# Patient Record
Sex: FEMALE | Race: BLACK OR AFRICAN AMERICAN | NOT HISPANIC OR LATINO | Employment: OTHER | URBAN - METROPOLITAN AREA
[De-identification: names, ages, dates, MRNs, and addresses within clinical notes are randomized per-mention and may not be internally consistent; named-entity substitution may affect disease eponyms.]

---

## 2023-04-18 LAB
ANION GAP IN SER/PLAS: 10 MMOL/L (ref 10–20)
BASOPHILS (10*3/UL) IN BLOOD BY AUTOMATED COUNT: 0.03 X10E9/L (ref 0–0.1)
BASOPHILS/100 LEUKOCYTES IN BLOOD BY AUTOMATED COUNT: 0.7 % (ref 0–2)
CALCIUM (MG/DL) IN SER/PLAS: 8.6 MG/DL (ref 8.6–10.3)
CARBON DIOXIDE, TOTAL (MMOL/L) IN SER/PLAS: 24 MMOL/L (ref 21–32)
CHLORIDE (MMOL/L) IN SER/PLAS: 109 MMOL/L (ref 98–107)
CREATININE (MG/DL) IN SER/PLAS: 1.07 MG/DL (ref 0.5–1.05)
EOSINOPHILS (10*3/UL) IN BLOOD BY AUTOMATED COUNT: 0.11 X10E9/L (ref 0–0.7)
EOSINOPHILS/100 LEUKOCYTES IN BLOOD BY AUTOMATED COUNT: 2.4 % (ref 0–6)
ERYTHROCYTE DISTRIBUTION WIDTH (RATIO) BY AUTOMATED COUNT: 14.9 % (ref 11.5–14.5)
ERYTHROCYTE MEAN CORPUSCULAR HEMOGLOBIN CONCENTRATION (G/DL) BY AUTOMATED: 31.6 G/DL (ref 32–36)
ERYTHROCYTE MEAN CORPUSCULAR VOLUME (FL) BY AUTOMATED COUNT: 86 FL (ref 80–100)
ERYTHROCYTES (10*6/UL) IN BLOOD BY AUTOMATED COUNT: 4.85 X10E12/L (ref 4–5.2)
GFR FEMALE: 58 ML/MIN/1.73M2
GLUCOSE (MG/DL) IN SER/PLAS: 95 MG/DL (ref 74–99)
HEMATOCRIT (%) IN BLOOD BY AUTOMATED COUNT: 41.8 % (ref 36–46)
HEMOGLOBIN (G/DL) IN BLOOD: 13.2 G/DL (ref 12–16)
IMMATURE GRANULOCYTES/100 LEUKOCYTES IN BLOOD BY AUTOMATED COUNT: 0 % (ref 0–0.9)
LEUKOCYTES (10*3/UL) IN BLOOD BY AUTOMATED COUNT: 4.6 X10E9/L (ref 4.4–11.3)
LYMPHOCYTES (10*3/UL) IN BLOOD BY AUTOMATED COUNT: 1.2 X10E9/L (ref 1.2–4.8)
LYMPHOCYTES/100 LEUKOCYTES IN BLOOD BY AUTOMATED COUNT: 26.1 % (ref 13–44)
MONOCYTES (10*3/UL) IN BLOOD BY AUTOMATED COUNT: 0.33 X10E9/L (ref 0.1–1)
MONOCYTES/100 LEUKOCYTES IN BLOOD BY AUTOMATED COUNT: 7.2 % (ref 2–10)
NEUTROPHILS (10*3/UL) IN BLOOD BY AUTOMATED COUNT: 2.93 X10E9/L (ref 1.2–7.7)
NEUTROPHILS/100 LEUKOCYTES IN BLOOD BY AUTOMATED COUNT: 63.6 % (ref 40–80)
PLATELETS (10*3/UL) IN BLOOD AUTOMATED COUNT: 204 X10E9/L (ref 150–450)
POTASSIUM (MMOL/L) IN SER/PLAS: 3.7 MMOL/L (ref 3.5–5.3)
SODIUM (MMOL/L) IN SER/PLAS: 139 MMOL/L (ref 136–145)
UREA NITROGEN (MG/DL) IN SER/PLAS: 15 MG/DL (ref 6–23)

## 2023-04-19 LAB — STAPH/MRSA SCREEN, CULTURE: NORMAL

## 2023-04-26 LAB — STAPH/MRSA SCREEN, CULTURE: NORMAL

## 2023-04-29 LAB — STAPH/MRSA SCREEN, CULTURE: NORMAL

## 2023-07-06 PROBLEM — M25.569 PAIN IN JOINT, LOWER LEG: Status: ACTIVE | Noted: 2023-07-06

## 2023-07-06 PROBLEM — E04.1 THYROID NODULE: Status: ACTIVE | Noted: 2023-07-06

## 2023-07-06 PROBLEM — M79.10 MYALGIA: Status: ACTIVE | Noted: 2023-07-06

## 2023-07-06 PROBLEM — L30.9 DERMATITIS: Status: ACTIVE | Noted: 2023-07-06

## 2023-07-06 PROBLEM — M48.062 NEUROGENIC CLAUDICATION DUE TO LUMBAR SPINAL STENOSIS: Status: ACTIVE | Noted: 2023-07-06

## 2023-07-06 PROBLEM — F43.22 ADJUSTMENT DISORDER WITH ANXIOUS MOOD: Status: ACTIVE | Noted: 2023-07-06

## 2023-07-06 PROBLEM — N95.1 POSTMENOPAUSAL DISORDER: Status: ACTIVE | Noted: 2023-07-06

## 2023-07-06 PROBLEM — M79.609 LIMB PAIN: Status: ACTIVE | Noted: 2023-07-06

## 2023-07-06 PROBLEM — F41.9 ANXIETY: Status: ACTIVE | Noted: 2023-07-06

## 2023-07-06 PROBLEM — B34.9 VIRAL SYNDROME: Status: RESOLVED | Noted: 2023-07-06 | Resolved: 2023-07-06

## 2023-07-06 PROBLEM — M25.559 HIP PAIN: Status: ACTIVE | Noted: 2023-07-06

## 2023-07-06 PROBLEM — M19.90 ARTHRITIS: Status: ACTIVE | Noted: 2023-07-06

## 2023-07-06 PROBLEM — M54.9 BACK PAIN: Status: ACTIVE | Noted: 2023-07-06

## 2023-07-06 PROBLEM — R32 URINARY INCONTINENCE: Status: ACTIVE | Noted: 2023-07-06

## 2023-07-06 PROBLEM — R92.8 ABNORMAL FINDING ON BREAST IMAGING: Status: ACTIVE | Noted: 2023-07-06

## 2023-07-06 PROBLEM — M17.11 ARTHRITIS OF RIGHT KNEE: Status: ACTIVE | Noted: 2023-07-06

## 2023-07-06 PROBLEM — R35.0 URINARY FREQUENCY: Status: RESOLVED | Noted: 2023-07-06 | Resolved: 2023-07-06

## 2023-07-06 PROBLEM — N89.8 VAGINAL DISCHARGE: Status: RESOLVED | Noted: 2023-07-06 | Resolved: 2023-07-06

## 2023-07-06 PROBLEM — M35.9 UNDIFFERENTIATED CONNECTIVE TISSUE DISEASE (MULTI): Status: ACTIVE | Noted: 2023-07-06

## 2023-07-06 PROBLEM — J98.01 ACUTE BRONCHOSPASM: Status: RESOLVED | Noted: 2023-07-06 | Resolved: 2023-07-06

## 2023-07-06 PROBLEM — R79.9 ABNORMAL BLOOD CHEMISTRY: Status: ACTIVE | Noted: 2023-07-06

## 2023-07-06 PROBLEM — S93.409A ANKLE SPRAIN: Status: ACTIVE | Noted: 2023-07-06

## 2023-07-06 PROBLEM — R63.4 WEIGHT LOSS: Status: ACTIVE | Noted: 2023-07-06

## 2023-07-06 PROBLEM — R05.9 COUGH: Status: ACTIVE | Noted: 2023-07-06

## 2023-07-06 PROBLEM — J01.90 ACUTE SINUSITIS: Status: RESOLVED | Noted: 2023-07-06 | Resolved: 2023-07-06

## 2023-07-06 PROBLEM — B37.9 YEAST INFECTION: Status: RESOLVED | Noted: 2023-07-06 | Resolved: 2023-07-06

## 2023-07-06 PROBLEM — I10 ESSENTIAL HYPERTENSION: Status: ACTIVE | Noted: 2023-07-06

## 2023-07-06 PROBLEM — G89.29 CHRONIC PAIN: Status: ACTIVE | Noted: 2023-07-06

## 2023-07-06 PROBLEM — J06.9 ACUTE UPPER RESPIRATORY INFECTION: Status: RESOLVED | Noted: 2023-07-06 | Resolved: 2023-07-06

## 2023-07-06 PROBLEM — F32.A DEPRESSION: Status: ACTIVE | Noted: 2023-07-06

## 2023-07-06 PROBLEM — K58.9 IRRITABLE BOWEL SYNDROME: Status: ACTIVE | Noted: 2023-07-06

## 2023-07-06 PROBLEM — E78.5 HYPERLIPIDEMIA: Status: ACTIVE | Noted: 2023-07-06

## 2023-07-06 PROBLEM — R76.0 ABNORMAL ANTINUCLEAR ANTIBODY TITER: Status: ACTIVE | Noted: 2023-07-06

## 2023-07-06 PROBLEM — N39.3 SUI (STRESS URINARY INCONTINENCE, FEMALE): Status: ACTIVE | Noted: 2023-07-06

## 2023-07-06 PROBLEM — J20.9 ACUTE BRONCHITIS: Status: RESOLVED | Noted: 2023-07-06 | Resolved: 2023-07-06

## 2023-07-06 PROBLEM — M62.830 BACK MUSCLE SPASM: Status: ACTIVE | Noted: 2023-07-06

## 2023-07-06 PROBLEM — S60.221A CONTUSION OF RIGHT HAND: Status: ACTIVE | Noted: 2023-07-06

## 2023-07-06 PROBLEM — E55.9 VITAMIN D DEFICIENCY: Status: ACTIVE | Noted: 2023-07-06

## 2023-07-06 PROBLEM — E89.0 STATUS POST PARTIAL THYROIDECTOMY: Status: ACTIVE | Noted: 2023-07-06
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
## 2023-07-06 PROBLEM — R87.810 CERVICAL HIGH RISK HPV (HUMAN PAPILLOMAVIRUS) TEST POSITIVE: Status: ACTIVE | Noted: 2023-07-06

## 2023-07-06 PROBLEM — M79.7 FIBROMYALGIA: Status: ACTIVE | Noted: 2023-07-06

## 2023-07-06 PROBLEM — R23.3 EASY BRUISING: Status: ACTIVE | Noted: 2023-07-06

## 2023-07-06 PROBLEM — M17.31 POST-TRAUMATIC OSTEOARTHRITIS OF RIGHT KNEE: Status: ACTIVE | Noted: 2023-07-06

## 2023-07-06 PROBLEM — M54.12 CERVICAL RADICULITIS: Status: ACTIVE | Noted: 2023-07-06

## 2023-07-06 RX ORDER — DICYCLOMINE HYDROCHLORIDE 20 MG/1
1 TABLET ORAL 4 TIMES DAILY PRN
COMMUNITY
Start: 2013-10-21

## 2023-07-06 RX ORDER — ERGOCALCIFEROL 1.25 MG/1
1 CAPSULE ORAL WEEKLY
COMMUNITY
Start: 2021-08-03 | End: 2024-03-27 | Stop reason: ALTCHOICE

## 2023-07-06 RX ORDER — TIZANIDINE 4 MG/1
1 TABLET ORAL 2 TIMES DAILY
COMMUNITY
Start: 2022-03-07

## 2023-07-06 RX ORDER — LISINOPRIL AND HYDROCHLOROTHIAZIDE 20; 25 MG/1; MG/1
1 TABLET ORAL DAILY
COMMUNITY
Start: 2019-11-07 | End: 2023-07-07 | Stop reason: SDUPTHER

## 2023-07-06 RX ORDER — TOPIRAMATE 100 MG/1
1 TABLET, FILM COATED ORAL 2 TIMES DAILY
COMMUNITY
Start: 2021-03-10 | End: 2024-06-03

## 2023-07-06 RX ORDER — HYDROXYCHLOROQUINE SULFATE 200 MG/1
1 TABLET, FILM COATED ORAL 2 TIMES DAILY
COMMUNITY
Start: 2017-02-15 | End: 2024-02-19 | Stop reason: SDUPTHER

## 2023-07-07 ENCOUNTER — OFFICE VISIT (OUTPATIENT)
Dept: PRIMARY CARE | Facility: CLINIC | Age: 63
End: 2023-07-07
Payer: COMMERCIAL

## 2023-07-07 VITALS
SYSTOLIC BLOOD PRESSURE: 145 MMHG | HEART RATE: 84 BPM | DIASTOLIC BLOOD PRESSURE: 99 MMHG | BODY MASS INDEX: 33.08 KG/M2 | OXYGEN SATURATION: 98 % | WEIGHT: 224 LBS

## 2023-07-07 DIAGNOSIS — I10 ESSENTIAL HYPERTENSION: Primary | ICD-10-CM

## 2023-07-07 DIAGNOSIS — M79.7 FIBROMYALGIA: ICD-10-CM

## 2023-07-07 DIAGNOSIS — N18.31 STAGE 3A CHRONIC KIDNEY DISEASE (MULTI): ICD-10-CM

## 2023-07-07 DIAGNOSIS — M35.9 UNDIFFERENTIATED CONNECTIVE TISSUE DISEASE (MULTI): ICD-10-CM

## 2023-07-07 PROBLEM — S83.249A ACUTE MEDIAL MENISCUS TEAR: Status: RESOLVED | Noted: 2018-05-01 | Resolved: 2023-07-07

## 2023-07-07 PROBLEM — M25.551 PAIN OF BOTH HIP JOINTS: Status: ACTIVE | Noted: 2017-08-28

## 2023-07-07 PROBLEM — M70.62 TROCHANTERIC BURSITIS, LEFT HIP: Status: ACTIVE | Noted: 2018-01-22

## 2023-07-07 PROBLEM — M47.816 LUMBAR SPONDYLOSIS: Status: ACTIVE | Noted: 2017-07-21

## 2023-07-07 PROBLEM — M25.552 PAIN OF BOTH HIP JOINTS: Status: ACTIVE | Noted: 2017-08-28

## 2023-07-07 PROCEDURE — 99213 OFFICE O/P EST LOW 20 MIN: CPT | Performed by: PHYSICIAN ASSISTANT

## 2023-07-07 PROCEDURE — 1036F TOBACCO NON-USER: CPT | Performed by: PHYSICIAN ASSISTANT

## 2023-07-07 PROCEDURE — 3080F DIAST BP >= 90 MM HG: CPT | Performed by: PHYSICIAN ASSISTANT

## 2023-07-07 PROCEDURE — 3008F BODY MASS INDEX DOCD: CPT | Performed by: PHYSICIAN ASSISTANT

## 2023-07-07 PROCEDURE — 3077F SYST BP >= 140 MM HG: CPT | Performed by: PHYSICIAN ASSISTANT

## 2023-07-07 RX ORDER — LISINOPRIL AND HYDROCHLOROTHIAZIDE 20; 25 MG/1; MG/1
1 TABLET ORAL DAILY
Qty: 90 TABLET | Refills: 1 | Status: SHIPPED | OUTPATIENT
Start: 2023-07-07 | End: 2024-01-10 | Stop reason: SDUPTHER

## 2023-07-07 NOTE — PROGRESS NOTES
Subjective   Libra Chery is a 62 y.o. female who presents for 6 month f/u.  HPI Libra Chery is a 62 y.o. female presenting for a follow up. Generally doing well.  No complaints.    HTN: uncontrolled on current regimen, patient attributes this to forgetting to take her BP medication yesterday. Does not check BP at home. Denies HA, dizziness, chest pain, SOB/PAUL, LE edema.     Fibromyalgia, UCTD, chronic pain: follows with rheumatology, pain management, and orthopedics. Stable on current pain regimen-hydroxychloroquine 200 mg, tizanidine 4 mg, topiramate 100 mg.  S/p R TKA 5/1/23. She is attending PT 1x/week.  She is very pleased with her progress following the TKA.  Ambulating without assistive devices.  Encouraged to schedule for an eye exam with hydroxychloroquine use.    CKD, stage IIIa: Stable.  Avoiding nephrotoxic medications. On lisinopril 20 mg.  Not on any SGLT2 inhibitors    Health maintenance:  Immunizations:  -Flu: Deferred to fall 2023  -Shingrix: recommended, deferred to next appointment due to not having it in stock  -Tdap: Recommended  Mammogram UTD (12/8/22)   Colon CA screening UTD (last 6/16/15) - 10yrs  DEXA last 6/19/15 - normal bone density  CT cardiac scoring due - deferred     Last CPE: 1/6/23 (commercial)    12 point ROS reviewed and negative other than as stated in HPI    BP (!) 145/99   Pulse 84   Wt 102 kg (224 lb)   SpO2 98%   BMI 33.08 kg/m²   Objective   Physical Exam  Vitals reviewed.   Constitutional:       General: She is not in acute distress.     Appearance: Normal appearance. She is not ill-appearing.   HENT:      Head: Normocephalic and atraumatic.   Eyes:      General: No scleral icterus.     Extraocular Movements: Extraocular movements intact.      Conjunctiva/sclera: Conjunctivae normal.      Pupils: Pupils are equal, round, and reactive to light.   Cardiovascular:      Rate and Rhythm: Normal rate and regular rhythm.      Heart sounds: Normal heart sounds. No  murmur heard.     No friction rub. No gallop.   Pulmonary:      Effort: Pulmonary effort is normal. No respiratory distress.      Breath sounds: Normal breath sounds. No stridor. No wheezing, rhonchi or rales.   Musculoskeletal:      Cervical back: Normal range of motion.      Right lower leg: No edema.      Left lower leg: No edema.   Skin:     General: Skin is warm and dry.      Comments: Well-healed scar on the right knee following TKA   Neurological:      Mental Status: She is alert and oriented to person, place, and time. Mental status is at baseline.      Cranial Nerves: No cranial nerve deficit.      Gait: Gait normal.   Psychiatric:         Mood and Affect: Mood normal.         Behavior: Behavior normal.         Assessment/Plan   Problem List Items Addressed This Visit       Essential hypertension - Primary     Above goal of <130/80.  Encourage patient to begin monitoring BP at home 1-2 times daily and logging values.  If consistently above goal, I recommend we discontinue lisinopril 20 mg and begin olmesartan 40 mg or valsartan 320 mg in addition to the hydrochlorothiazide 25 mg.  Follow strict DASH diet and exercise as tolerated.         Relevant Medications    lisinopriL-hydrochlorothiazide 20-25 mg tablet    Fibromyalgia     Follow with rheumatology.  Continue hydroxychloroquine 200 mg, tizanidine 4 mg, topiramate 100 mg.  Schedule for eye exam with hydroxychloroquine use         Undifferentiated connective tissue disease (CMS/HCC)     Stable.  Following with rheumatology.  Continue  hydroxychloroquine 200 mg, tizanidine 4 mg, topiramate 100 mg.         Stage 3a chronic kidney disease     Stable.  Encourage strict control of blood pressure to prevent further worsening of kidney disease.  Consider switching lisinopril 20 mg for olmesartan 40 mg or valsartan 320 mg in the future.  Consider SGLT2 inhibitor.  Avoid nephrotoxic medications drink adequate water.             Follow-up in 6 months for CPE or  sooner as needed

## 2023-07-07 NOTE — ASSESSMENT & PLAN NOTE
Above goal of <130/80.  Encourage patient to begin monitoring BP at home 1-2 times daily and logging values.  If consistently above goal, I recommend we discontinue lisinopril 20 mg and begin olmesartan 40 mg or valsartan 320 mg in addition to the hydrochlorothiazide 25 mg.  Follow strict DASH diet and exercise as tolerated.

## 2023-07-07 NOTE — ASSESSMENT & PLAN NOTE
Stable.  Encourage strict control of blood pressure to prevent further worsening of kidney disease.  Consider switching lisinopril 20 mg for olmesartan 40 mg or valsartan 320 mg in the future.  Consider SGLT2 inhibitor.  Avoid nephrotoxic medications drink adequate water.

## 2023-07-07 NOTE — ASSESSMENT & PLAN NOTE
Follow with rheumatology.  Continue hydroxychloroquine 200 mg, tizanidine 4 mg, topiramate 100 mg.  Schedule for eye exam with hydroxychloroquine use

## 2023-07-07 NOTE — ASSESSMENT & PLAN NOTE
Stable.  Following with rheumatology.  Continue  hydroxychloroquine 200 mg, tizanidine 4 mg, topiramate 100 mg.

## 2023-09-18 ENCOUNTER — CLINICAL SUPPORT (OUTPATIENT)
Dept: PRIMARY CARE | Facility: CLINIC | Age: 63
End: 2023-09-18
Payer: COMMERCIAL

## 2023-09-18 DIAGNOSIS — Z23 ENCOUNTER FOR IMMUNIZATION: ICD-10-CM

## 2023-09-18 PROCEDURE — 90471 IMMUNIZATION ADMIN: CPT | Performed by: PHYSICIAN ASSISTANT

## 2023-09-18 PROCEDURE — 90750 HZV VACC RECOMBINANT IM: CPT | Performed by: PHYSICIAN ASSISTANT

## 2023-11-28 ENCOUNTER — CLINICAL SUPPORT (OUTPATIENT)
Dept: PRIMARY CARE | Facility: CLINIC | Age: 63
End: 2023-11-28
Payer: COMMERCIAL

## 2023-11-28 DIAGNOSIS — Z23 ENCOUNTER FOR ADMINISTRATION OF VACCINE: Primary | ICD-10-CM

## 2023-11-28 PROCEDURE — 90471 IMMUNIZATION ADMIN: CPT | Performed by: PHYSICIAN ASSISTANT

## 2023-11-28 PROCEDURE — 90750 HZV VACC RECOMBINANT IM: CPT | Performed by: PHYSICIAN ASSISTANT

## 2023-12-05 ENCOUNTER — APPOINTMENT (OUTPATIENT)
Dept: ENDOCRINOLOGY | Facility: CLINIC | Age: 63
End: 2023-12-05
Payer: COMMERCIAL

## 2023-12-07 ENCOUNTER — TELEPHONE (OUTPATIENT)
Dept: PRIMARY CARE | Facility: CLINIC | Age: 63
End: 2023-12-07
Payer: COMMERCIAL

## 2023-12-12 ENCOUNTER — APPOINTMENT (OUTPATIENT)
Dept: RADIOLOGY | Facility: CLINIC | Age: 63
End: 2023-12-12
Payer: COMMERCIAL

## 2023-12-27 ENCOUNTER — CLINICAL SUPPORT (OUTPATIENT)
Dept: PRIMARY CARE | Facility: CLINIC | Age: 63
End: 2023-12-27
Payer: COMMERCIAL

## 2023-12-27 DIAGNOSIS — Z23 ENCOUNTER FOR IMMUNIZATION: ICD-10-CM

## 2023-12-27 PROCEDURE — 90686 IIV4 VACC NO PRSV 0.5 ML IM: CPT | Performed by: PHYSICIAN ASSISTANT

## 2023-12-27 PROCEDURE — 90471 IMMUNIZATION ADMIN: CPT | Performed by: PHYSICIAN ASSISTANT

## 2024-01-03 ENCOUNTER — ANCILLARY PROCEDURE (OUTPATIENT)
Dept: RADIOLOGY | Facility: CLINIC | Age: 64
End: 2024-01-03
Payer: COMMERCIAL

## 2024-01-03 DIAGNOSIS — Z12.39 ENCOUNTER FOR OTHER SCREENING FOR MALIGNANT NEOPLASM OF BREAST: ICD-10-CM

## 2024-01-03 PROCEDURE — 77063 BREAST TOMOSYNTHESIS BI: CPT | Mod: BILATERAL PROCEDURE | Performed by: RADIOLOGY

## 2024-01-03 PROCEDURE — 77067 SCR MAMMO BI INCL CAD: CPT | Mod: BILATERAL PROCEDURE | Performed by: RADIOLOGY

## 2024-01-03 PROCEDURE — 77067 SCR MAMMO BI INCL CAD: CPT

## 2024-01-10 ENCOUNTER — LAB (OUTPATIENT)
Dept: LAB | Facility: LAB | Age: 64
End: 2024-01-10
Payer: COMMERCIAL

## 2024-01-10 ENCOUNTER — OFFICE VISIT (OUTPATIENT)
Dept: PRIMARY CARE | Facility: CLINIC | Age: 64
End: 2024-01-10
Payer: COMMERCIAL

## 2024-01-10 VITALS
BODY MASS INDEX: 31.84 KG/M2 | HEIGHT: 69 IN | SYSTOLIC BLOOD PRESSURE: 134 MMHG | OXYGEN SATURATION: 98 % | WEIGHT: 215 LBS | DIASTOLIC BLOOD PRESSURE: 84 MMHG | HEART RATE: 72 BPM

## 2024-01-10 DIAGNOSIS — Z11.59 NEED FOR HEPATITIS C SCREENING TEST: ICD-10-CM

## 2024-01-10 DIAGNOSIS — I10 ESSENTIAL HYPERTENSION: ICD-10-CM

## 2024-01-10 DIAGNOSIS — N18.31 STAGE 3A CHRONIC KIDNEY DISEASE (MULTI): ICD-10-CM

## 2024-01-10 DIAGNOSIS — M35.9 UNDIFFERENTIATED CONNECTIVE TISSUE DISEASE (MULTI): ICD-10-CM

## 2024-01-10 DIAGNOSIS — Z00.00 ROUTINE GENERAL MEDICAL EXAMINATION AT A HEALTH CARE FACILITY: ICD-10-CM

## 2024-01-10 DIAGNOSIS — E04.2 MULTINODULAR GOITER: ICD-10-CM

## 2024-01-10 DIAGNOSIS — Z00.00 ROUTINE GENERAL MEDICAL EXAMINATION AT A HEALTH CARE FACILITY: Primary | ICD-10-CM

## 2024-01-10 LAB
25(OH)D3 SERPL-MCNC: 26 NG/ML (ref 30–100)
ALBUMIN SERPL BCP-MCNC: 4.5 G/DL (ref 3.4–5)
ALP SERPL-CCNC: 75 U/L (ref 33–136)
ALT SERPL W P-5'-P-CCNC: 13 U/L (ref 7–45)
ANION GAP SERPL CALC-SCNC: 13 MMOL/L (ref 10–20)
AST SERPL W P-5'-P-CCNC: 14 U/L (ref 9–39)
BASOPHILS # BLD AUTO: 0.02 X10*3/UL (ref 0–0.1)
BASOPHILS NFR BLD AUTO: 0.4 %
BILIRUB SERPL-MCNC: 0.4 MG/DL (ref 0–1.2)
BUN SERPL-MCNC: 18 MG/DL (ref 6–23)
CALCIUM SERPL-MCNC: 9.8 MG/DL (ref 8.6–10.6)
CHLORIDE SERPL-SCNC: 106 MMOL/L (ref 98–107)
CHOLEST SERPL-MCNC: 194 MG/DL (ref 0–199)
CHOLESTEROL/HDL RATIO: 2.2
CO2 SERPL-SCNC: 27 MMOL/L (ref 21–32)
CREAT SERPL-MCNC: 1.02 MG/DL (ref 0.5–1.05)
EGFRCR SERPLBLD CKD-EPI 2021: 62 ML/MIN/1.73M*2
EOSINOPHIL # BLD AUTO: 0.19 X10*3/UL (ref 0–0.7)
EOSINOPHIL NFR BLD AUTO: 3.5 %
ERYTHROCYTE [DISTWIDTH] IN BLOOD BY AUTOMATED COUNT: 15.9 % (ref 11.5–14.5)
EST. AVERAGE GLUCOSE BLD GHB EST-MCNC: 120 MG/DL
GLUCOSE SERPL-MCNC: 94 MG/DL (ref 74–99)
HBA1C MFR BLD: 5.8 %
HCT VFR BLD AUTO: 41.4 % (ref 36–46)
HCV AB SER QL: NONREACTIVE
HDLC SERPL-MCNC: 89.1 MG/DL
HGB BLD-MCNC: 12.9 G/DL (ref 12–16)
IMM GRANULOCYTES # BLD AUTO: 0.01 X10*3/UL (ref 0–0.7)
IMM GRANULOCYTES NFR BLD AUTO: 0.2 % (ref 0–0.9)
LDLC SERPL CALC-MCNC: 89 MG/DL
LYMPHOCYTES # BLD AUTO: 1.58 X10*3/UL (ref 1.2–4.8)
LYMPHOCYTES NFR BLD AUTO: 29.2 %
MCH RBC QN AUTO: 27.2 PG (ref 26–34)
MCHC RBC AUTO-ENTMCNC: 31.2 G/DL (ref 32–36)
MCV RBC AUTO: 87 FL (ref 80–100)
MONOCYTES # BLD AUTO: 0.44 X10*3/UL (ref 0.1–1)
MONOCYTES NFR BLD AUTO: 8.1 %
NEUTROPHILS # BLD AUTO: 3.18 X10*3/UL (ref 1.2–7.7)
NEUTROPHILS NFR BLD AUTO: 58.6 %
NON HDL CHOLESTEROL: 105 MG/DL (ref 0–149)
NRBC BLD-RTO: 0 /100 WBCS (ref 0–0)
PLATELET # BLD AUTO: 243 X10*3/UL (ref 150–450)
POTASSIUM SERPL-SCNC: 3.8 MMOL/L (ref 3.5–5.3)
PROT SERPL-MCNC: 7.6 G/DL (ref 6.4–8.2)
RBC # BLD AUTO: 4.74 X10*6/UL (ref 4–5.2)
SODIUM SERPL-SCNC: 142 MMOL/L (ref 136–145)
TRIGL SERPL-MCNC: 79 MG/DL (ref 0–149)
TSH SERPL-ACNC: 2.64 MIU/L (ref 0.44–3.98)
VLDL: 16 MG/DL (ref 0–40)
WBC # BLD AUTO: 5.4 X10*3/UL (ref 4.4–11.3)

## 2024-01-10 PROCEDURE — 3075F SYST BP GE 130 - 139MM HG: CPT | Performed by: PHYSICIAN ASSISTANT

## 2024-01-10 PROCEDURE — 83036 HEMOGLOBIN GLYCOSYLATED A1C: CPT

## 2024-01-10 PROCEDURE — 82306 VITAMIN D 25 HYDROXY: CPT

## 2024-01-10 PROCEDURE — 99396 PREV VISIT EST AGE 40-64: CPT | Performed by: PHYSICIAN ASSISTANT

## 2024-01-10 PROCEDURE — 3079F DIAST BP 80-89 MM HG: CPT | Performed by: PHYSICIAN ASSISTANT

## 2024-01-10 PROCEDURE — 86803 HEPATITIS C AB TEST: CPT

## 2024-01-10 PROCEDURE — 80053 COMPREHEN METABOLIC PANEL: CPT

## 2024-01-10 PROCEDURE — 1036F TOBACCO NON-USER: CPT | Performed by: PHYSICIAN ASSISTANT

## 2024-01-10 PROCEDURE — 84443 ASSAY THYROID STIM HORMONE: CPT

## 2024-01-10 PROCEDURE — 85025 COMPLETE CBC W/AUTO DIFF WBC: CPT

## 2024-01-10 PROCEDURE — 3008F BODY MASS INDEX DOCD: CPT | Performed by: PHYSICIAN ASSISTANT

## 2024-01-10 PROCEDURE — 80061 LIPID PANEL: CPT

## 2024-01-10 PROCEDURE — 36415 COLL VENOUS BLD VENIPUNCTURE: CPT

## 2024-01-10 RX ORDER — LISINOPRIL AND HYDROCHLOROTHIAZIDE 20; 25 MG/1; MG/1
1 TABLET ORAL DAILY
Qty: 90 TABLET | Refills: 1 | Status: SHIPPED | OUTPATIENT
Start: 2024-01-10

## 2024-01-10 ASSESSMENT — PATIENT HEALTH QUESTIONNAIRE - PHQ9
2. FEELING DOWN, DEPRESSED OR HOPELESS: NOT AT ALL
1. LITTLE INTEREST OR PLEASURE IN DOING THINGS: NOT AT ALL
SUM OF ALL RESPONSES TO PHQ9 QUESTIONS 1 AND 2: 0

## 2024-01-10 NOTE — ASSESSMENT & PLAN NOTE
Acceptable. Continue lisinopril-hydrochlorothiazide 20-25mg. Follow strict DASH diet and exercise as tolerated.

## 2024-01-10 NOTE — ASSESSMENT & PLAN NOTE
Stable.  Encourage strict control of blood pressure to prevent further worsening of kidney disease. Continue lisinopril 20mg. Consider SGLT2 inhibitor or kerendia.  Avoid nephrotoxic medications drink adequate water.

## 2024-01-10 NOTE — PROGRESS NOTES
"Xu Chery is a 63 y.o. female who presents for Annual Exam. Generally doing well. No new complaints.     HTN: taking lisinopril-hydrochlorothiazide 20-25mg. Does not check BP at home. Denies HA, dizziness, chest pain, SOB/PAUL, LE edema.     Fibromyalgia, UCTD, chronic pain: follows with rheumatology, pain management, and orthopedics. Stable on current pain regimen: hydroxychloroquine 200 mg, tizanidine 4 mg, topiramate 100 mg.  S/p R TKA 5/1/23. She is attending PT 1x/week.  She is very pleased with her progress following the TKA. Ambulating without assistive devices.  UTD with eye exam (d/t hydroxychloroquine use), scheduled 4/2024.     CKD, stage IIIa: Stable. Avoiding nephrotoxic medications. On lisinopril 20 mg.  Not on any SGLT2 inhibitors    Multinodular goiter: last thyroid US 4/2022. Follows with endocrinology     Health maintenance:  Immunizations:  -Flu: UTD, 2023   -Shingrix: UTD  -Tdap: Recommended  Mammogram UTD (last 1/3/24)  Colon CA screening UTD (last 6/16/15) - 10yrs  DEXA last 6/19/15 - normal bone density  CT cardiac scoring due - deferred   Eye care: UTD, scheduled 4/2024. Wears readers  Dental care: UTD     Last CPE: 1/10/24 (commercial)    12 point ROS reviewed and negative other than as stated in HPI    /84   Pulse 72   Ht 1.753 m (5' 9\")   Wt 97.5 kg (215 lb)   SpO2 98%   BMI 31.75 kg/m²   Objective   Physical Exam  Vitals reviewed.   Constitutional:       General: She is not in acute distress.     Appearance: Normal appearance.   HENT:      Head: Normocephalic and atraumatic.      Right Ear: Tympanic membrane, ear canal and external ear normal. There is no impacted cerumen.      Left Ear: Tympanic membrane, ear canal and external ear normal. There is no impacted cerumen.      Nose: Nose normal. No congestion or rhinorrhea.      Mouth/Throat:      Mouth: Mucous membranes are moist.      Pharynx: Oropharynx is clear. No oropharyngeal exudate or posterior " oropharyngeal erythema.   Eyes:      General: No scleral icterus.        Right eye: No discharge.         Left eye: No discharge.      Extraocular Movements: Extraocular movements intact.      Conjunctiva/sclera: Conjunctivae normal.      Pupils: Pupils are equal, round, and reactive to light.   Cardiovascular:      Rate and Rhythm: Normal rate and regular rhythm.      Heart sounds: Normal heart sounds. No murmur heard.     No friction rub. No gallop.   Pulmonary:      Effort: Pulmonary effort is normal. No respiratory distress.      Breath sounds: Normal breath sounds. No stridor. No wheezing, rhonchi or rales.   Abdominal:      General: Bowel sounds are normal. There is no distension.      Palpations: Abdomen is soft. There is no mass.      Tenderness: There is no abdominal tenderness. There is no right CVA tenderness or left CVA tenderness.   Musculoskeletal:         General: Normal range of motion.      Cervical back: Normal range of motion and neck supple.      Right lower leg: No edema.      Left lower leg: No edema.   Skin:     General: Skin is warm and dry.      Findings: No rash.   Neurological:      General: No focal deficit present.      Mental Status: She is alert and oriented to person, place, and time. Mental status is at baseline.      Cranial Nerves: No cranial nerve deficit.      Gait: Gait normal.   Psychiatric:         Mood and Affect: Mood normal.         Behavior: Behavior normal.         Assessment/Plan   Problem List Items Addressed This Visit       Essential hypertension     Acceptable. Continue lisinopril-hydrochlorothiazide 20-25mg. Follow strict DASH diet and exercise as tolerated.         Relevant Medications    lisinopriL-hydrochlorothiazide 20-25 mg tablet    Undifferentiated connective tissue disease (CMS/HCC)     Stable.  Following with rheumatology.  Continue  hydroxychloroquine 200 mg, tizanidine 4 mg, topiramate 100 mg.         Stage 3a chronic kidney disease (CMS/HCC)     Stable.   Encourage strict control of blood pressure to prevent further worsening of kidney disease. Continue lisinopril 20mg. Consider SGLT2 inhibitor or kerendia.  Avoid nephrotoxic medications drink adequate water.         Need for hepatitis C screening test     One time hepatitis C screening ordered          Relevant Orders    Hepatitis C Antibody    Routine general medical examination at a health care facility - Primary     Discussed age appropriate preventive health measures. CPE labs ordered         Relevant Orders    CBC and Auto Differential    Comprehensive Metabolic Panel    Vitamin D 25-Hydroxy,Total (for eval of Vitamin D levels)    TSH with reflex to Free T4 if abnormal    Hemoglobin A1C    Lipid Panel    Multinodular goiter     Schedule with endocrinology.             Follow up in 6 months with Dr. Riggs or sooner as needed

## 2024-01-17 NOTE — RESULT ENCOUNTER NOTE
Hemoglobin A1c increased to 5.8%, in the prediabetic range.  Cut back on carbohydrates and sugary drinks/foods in the diet.    Vitamin D was low.  Take over-the-counter vitamin D3 2000 units daily with food    Remainder of labs look stable

## 2024-02-19 ENCOUNTER — OFFICE VISIT (OUTPATIENT)
Dept: RHEUMATOLOGY | Facility: CLINIC | Age: 64
End: 2024-02-19
Payer: COMMERCIAL

## 2024-02-19 VITALS — DIASTOLIC BLOOD PRESSURE: 82 MMHG | BODY MASS INDEX: 32.64 KG/M2 | SYSTOLIC BLOOD PRESSURE: 142 MMHG | WEIGHT: 221 LBS

## 2024-02-19 DIAGNOSIS — M35.9 UNDIFFERENTIATED CONNECTIVE TISSUE DISEASE (MULTI): Primary | ICD-10-CM

## 2024-02-19 DIAGNOSIS — M79.7 FIBROMYALGIA: ICD-10-CM

## 2024-02-19 DIAGNOSIS — M19.90 ARTHRITIS: ICD-10-CM

## 2024-02-19 PROCEDURE — 3008F BODY MASS INDEX DOCD: CPT | Performed by: INTERNAL MEDICINE

## 2024-02-19 PROCEDURE — 1036F TOBACCO NON-USER: CPT | Performed by: INTERNAL MEDICINE

## 2024-02-19 PROCEDURE — 99214 OFFICE O/P EST MOD 30 MIN: CPT | Performed by: INTERNAL MEDICINE

## 2024-02-19 PROCEDURE — 3077F SYST BP >= 140 MM HG: CPT | Performed by: INTERNAL MEDICINE

## 2024-02-19 PROCEDURE — 3079F DIAST BP 80-89 MM HG: CPT | Performed by: INTERNAL MEDICINE

## 2024-02-19 RX ORDER — HYDROXYCHLOROQUINE SULFATE 200 MG/1
200 TABLET, FILM COATED ORAL 2 TIMES DAILY
Qty: 180 TABLET | Refills: 1 | Status: SHIPPED | OUTPATIENT
Start: 2024-02-19

## 2024-03-27 ENCOUNTER — OFFICE VISIT (OUTPATIENT)
Dept: ENDOCRINOLOGY | Facility: CLINIC | Age: 64
End: 2024-03-27
Payer: COMMERCIAL

## 2024-03-27 VITALS
HEIGHT: 69 IN | DIASTOLIC BLOOD PRESSURE: 70 MMHG | SYSTOLIC BLOOD PRESSURE: 120 MMHG | RESPIRATION RATE: 16 BRPM | WEIGHT: 223.4 LBS | BODY MASS INDEX: 33.09 KG/M2 | HEART RATE: 76 BPM

## 2024-03-27 DIAGNOSIS — E04.1 THYROID NODULE: Primary | ICD-10-CM

## 2024-03-27 PROCEDURE — 3008F BODY MASS INDEX DOCD: CPT | Performed by: INTERNAL MEDICINE

## 2024-03-27 PROCEDURE — 1036F TOBACCO NON-USER: CPT | Performed by: INTERNAL MEDICINE

## 2024-03-27 PROCEDURE — 3074F SYST BP LT 130 MM HG: CPT | Performed by: INTERNAL MEDICINE

## 2024-03-27 PROCEDURE — 99213 OFFICE O/P EST LOW 20 MIN: CPT | Performed by: INTERNAL MEDICINE

## 2024-03-27 PROCEDURE — 3078F DIAST BP <80 MM HG: CPT | Performed by: INTERNAL MEDICINE

## 2024-03-27 RX ORDER — CHOLECALCIFEROL (VITAMIN D3) 50 MCG
50 TABLET ORAL DAILY
COMMUNITY

## 2024-03-27 ASSESSMENT — ENCOUNTER SYMPTOMS
SHORTNESS OF BREATH: 0
NAUSEA: 0
DIARRHEA: 0
PALPITATIONS: 0
HEADACHES: 0
COUGH: 0
FATIGUE: 0
VOMITING: 0
CHILLS: 0
FEVER: 0

## 2024-03-27 NOTE — PROGRESS NOTES
Endocrinology: Follow up visit  Subjective   Patient ID: Libra Chery is a 63 y.o. female who presents for Goiter.    PCP: Balbir Riggs MD    HPI  Last seen 12/2022  S/p milton tx for large nodule indeterminate path, final path benign  Since last visit doing well.   Feeling fine.   No dysphagia or neck complaints    Review of Systems   Constitutional:  Negative for chills, fatigue and fever.   Respiratory:  Negative for cough and shortness of breath.    Cardiovascular:  Negative for chest pain and palpitations.   Gastrointestinal:  Negative for diarrhea, nausea and vomiting.   Neurological:  Negative for headaches.       Patient Active Problem List   Diagnosis    Abnormal antinuclear antibody titer    Abnormal blood chemistry    Abnormal finding on breast imaging    Adjustment disorder with anxious mood    Ankle sprain    Anxiety    Arthritis    Dermatitis    Arthritis of right knee    Cervical high risk HPV (human papillomavirus) test positive    Cervical radiculitis    Contusion of right hand    Cough    Depression    Easy bruising    Essential hypertension    Fall    Hyperlipidemia    Irritable bowel syndrome    Myalgia    Back muscle spasm    Back pain    Chronic pain    Fibromyalgia    Hip pain    Limb pain    Neurogenic claudication due to lumbar spinal stenosis    Pain in joint, lower leg    Post-traumatic osteoarthritis of right knee    Postmenopausal disorder    Status post partial thyroidectomy    JOSIE (stress urinary incontinence, female)    Thyroid nodule    Undifferentiated connective tissue disease (CMS/HCC)    Urinary incontinence    Vitamin D deficiency    Weight loss    Carpal tunnel syndrome of left wrist    Extensor tenosynovitis of wrist    Lumbar spondylosis    Pain of both hip joints    Strain of left wrist    Trochanteric bursitis, left hip    Stage 3a chronic kidney disease (CMS/HCC)    Need for hepatitis C screening test    Routine general medical examination at a health care facility     "Multinodular goiter        Home Meds:  Current Outpatient Medications   Medication Instructions    cholecalciferol (VITAMIN D3) 50 mcg, oral, Daily    dicyclomine (Bentyl) 20 mg tablet 1 tablet, oral, 4 times daily PRN    hydroxychloroquine (PLAQUENIL) 200 mg, oral, 2 times daily    lisinopriL-hydrochlorothiazide 20-25 mg tablet 1 tablet, oral, Daily    tiZANidine (Zanaflex) 4 mg tablet 1 tablet, oral, 2 times daily    topiramate (Topamax) 100 mg tablet 1 tablet, oral, 2 times daily        No Known Allergies     Objective   Vitals:    03/27/24 0841   BP: 120/70   Pulse: 76   Resp: 16      Vitals:    03/27/24 0841   Weight: 101 kg (223 lb 6.4 oz)      Body mass index is 32.99 kg/m².   Physical Exam  Constitutional:       Appearance: Normal appearance. She is overweight.   HENT:      Head: Normocephalic and atraumatic.   Neck:      Thyroid: No thyroid mass, thyromegaly or thyroid tenderness.   Cardiovascular:      Rate and Rhythm: Normal rate and regular rhythm.      Heart sounds: No murmur heard.     No gallop.   Pulmonary:      Effort: Pulmonary effort is normal.      Breath sounds: Normal breath sounds.   Abdominal:      Palpations: Abdomen is soft.      Comments: benign   Neurological:      General: No focal deficit present.      Mental Status: She is alert and oriented to person, place, and time.      Deep Tendon Reflexes: Reflexes are normal and symmetric.   Psychiatric:         Behavior: Behavior is cooperative.         Labs:  Lab Results   Component Value Date    HGBA1C 5.8 (H) 01/10/2024    TSH 2.64 01/10/2024    FREET4 0.78 01/14/2020      No results found for: \"PR1\", \"THYROIDPAB\", \"TSI\"     Assessment/Plan   Problem List Items Addressed This Visit       Thyroid nodule - Primary     Discussed course  Recent tsh normal   Will check remaining gland with ultrasound and if looks good will follow up as needed    Electronically signed by:  Ivy Robles MD 03/27/24 9:10 AM              "

## 2024-04-09 ENCOUNTER — HOSPITAL ENCOUNTER (OUTPATIENT)
Dept: RADIOLOGY | Facility: CLINIC | Age: 64
End: 2024-04-09
Payer: COMMERCIAL

## 2024-04-11 ENCOUNTER — HOSPITAL ENCOUNTER (OUTPATIENT)
Dept: RADIOLOGY | Facility: CLINIC | Age: 64
Discharge: HOME | End: 2024-04-11
Payer: COMMERCIAL

## 2024-04-11 DIAGNOSIS — E04.1 THYROID NODULE: ICD-10-CM

## 2024-04-11 PROCEDURE — 76536 US EXAM OF HEAD AND NECK: CPT | Performed by: RADIOLOGY

## 2024-04-11 PROCEDURE — 76536 US EXAM OF HEAD AND NECK: CPT

## 2024-05-31 ENCOUNTER — HOSPITAL ENCOUNTER (OUTPATIENT)
Dept: RADIOLOGY | Facility: CLINIC | Age: 64
Discharge: HOME | End: 2024-05-31
Payer: COMMERCIAL

## 2024-05-31 ENCOUNTER — OFFICE VISIT (OUTPATIENT)
Dept: ORTHOPEDIC SURGERY | Facility: CLINIC | Age: 64
End: 2024-05-31
Payer: COMMERCIAL

## 2024-05-31 DIAGNOSIS — Z47.1 AFTERCARE FOLLOWING RIGHT KNEE JOINT REPLACEMENT SURGERY: ICD-10-CM

## 2024-05-31 DIAGNOSIS — Z96.651 AFTERCARE FOLLOWING RIGHT KNEE JOINT REPLACEMENT SURGERY: ICD-10-CM

## 2024-05-31 PROCEDURE — 73562 X-RAY EXAM OF KNEE 3: CPT | Mod: RIGHT SIDE | Performed by: STUDENT IN AN ORGANIZED HEALTH CARE EDUCATION/TRAINING PROGRAM

## 2024-05-31 PROCEDURE — 73562 X-RAY EXAM OF KNEE 3: CPT | Mod: RT

## 2024-05-31 PROCEDURE — 99214 OFFICE O/P EST MOD 30 MIN: CPT | Performed by: PHYSICIAN ASSISTANT

## 2024-05-31 RX ORDER — AMOXICILLIN 500 MG/1
CAPSULE ORAL
Qty: 4 CAPSULE | Refills: 6 | Status: SHIPPED | OUTPATIENT
Start: 2024-05-31

## 2024-05-31 ASSESSMENT — PAIN - FUNCTIONAL ASSESSMENT: PAIN_FUNCTIONAL_ASSESSMENT: NO/DENIES PAIN

## 2024-05-31 NOTE — PROGRESS NOTES
LEANNA Ruiz, PALeeannC, ATC  Adult Reconstruction and Joint Replacement Surgery  Phone: [phone]     Fax:[phone]            Chief Complaint   Patient presents with    Right Knee - Follow-up       HPI:  Libra Chery is a pleasant 63 y.o. year-old female here for follow-up of their side: right total knee arthroplasty by Dr. Stone.  The patient is approximately 1 year(s) postop.The patient has no mechanical symptoms.  The patient has no swelling and pain.   The patients wound has healed uneventfully.  The patient has been doing HEP and/or outpatient PT.  No complications postoperatively.  The patient is very happy with the result of the operation.    Review of Systems  Past Medical History:   Diagnosis Date    Acute bronchitis 07/06/2023    Acute bronchospasm 07/06/2023    Chronic pain syndrome 02/15/2017    Chronic pain syndrome    Essential (primary) hypertension     Benign essential hypertension    Personal history of other diseases of the musculoskeletal system and connective tissue     History of osteoarthritis    Personal history of other medical treatment 02/28/2019    History of screening mammography    Personal history of other mental and behavioral disorders     History of anxiety disorder    Radiculopathy, lumbar region     Lumbar radiculopathy    Urinary frequency 07/06/2023    Vaginal discharge 07/06/2023    Vitamin D deficiency, unspecified     Vitamin D deficiency     Patient Active Problem List   Diagnosis    Abnormal antinuclear antibody titer    Abnormal blood chemistry    Abnormal finding on breast imaging    Adjustment disorder with anxious mood    Ankle sprain    Anxiety    Arthritis    Dermatitis    Arthritis of right knee    Cervical high risk HPV (human papillomavirus) test positive    Cervical radiculitis    Contusion of right hand    Cough    Depression    Easy bruising    Essential hypertension    Fall    Hyperlipidemia    Irritable bowel syndrome    Myalgia     Back muscle spasm    Back pain    Chronic pain    Fibromyalgia    Hip pain    Limb pain    Neurogenic claudication due to lumbar spinal stenosis    Pain in joint, lower leg    Post-traumatic osteoarthritis of right knee    Postmenopausal disorder    Status post partial thyroidectomy    JOSIE (stress urinary incontinence, female)    Thyroid nodule    Undifferentiated connective tissue disease (Multi)    Urinary incontinence    Vitamin D deficiency    Weight loss    Carpal tunnel syndrome of left wrist    Extensor tenosynovitis of wrist    Lumbar spondylosis    Pain of both hip joints    Strain of left wrist    Trochanteric bursitis, left hip    Stage 3a chronic kidney disease (Multi)    Need for hepatitis C screening test    Routine general medical examination at a health care facility    Multinodular goiter     Medication Documentation Review Audit       Reviewed by Kat Silver LPN (Licensed Nurse) on 05/31/24 at 0936      Medication Order Taking? Sig Documenting Provider Last Dose Status   cholecalciferol (Vitamin D3) 50 MCG (2000 UT) tablet [id number] Yes Take 1 tablet (50 mcg) by mouth once daily. Historical MD Monik Taking Active   dicyclomine (Bentyl) 20 mg tablet [id number] Yes Take 1 tablet (20 mg) by mouth 4 times a day as needed. Historical Provider, MD Taking Active   hydroxychloroquine (Plaquenil) 200 mg tablet [id number] Yes Take 1 tablet (200 mg) by mouth 2 times a day. Daphney Maria MD Taking Active   lisinopriL-hydrochlorothiazide 20-25 mg tablet [id number] Yes Take 1 tablet by mouth once daily. Beth Ahn PA-C Taking Active   tiZANidine (Zanaflex) 4 mg tablet [id number] Yes Take 1 tablet (4 mg) by mouth 2 times a day. Historical Provider, MD Taking Active   topiramate (Topamax) 100 mg tablet [id number] Yes Take 1 tablet (100 mg) by mouth 2 times a day. Historical Provider, MD Taking Active                  No Known Allergies  Social History     Socioeconomic History    Marital status: Single      Spouse name: Not on file    Number of children: Not on file    Years of education: Not on file    Highest education level: Not on file   Occupational History    Not on file   Tobacco Use    Smoking status: Never    Smokeless tobacco: Never   Substance and Sexual Activity    Alcohol use: Not on file    Drug use: Yes     Types: Marijuana    Sexual activity: Not on file   Other Topics Concern    Not on file   Social History Narrative    Not on file     Social Determinants of Health     Financial Resource Strain: Not on file   Food Insecurity: Not on file   Transportation Needs: Not on file   Physical Activity: Not on file   Stress: Not on file   Social Connections: Not on file   Intimate Partner Violence: Not on file   Housing Stability: Not on file     Past Surgical History:   Procedure Laterality Date    CARPAL TUNNEL RELEASE  2013    Neuroplasty Decompression Median Nerve At Carpal Tunnel     SECTION, CLASSIC  12/10/2014     Section    OTHER SURGICAL HISTORY  12/10/2014    Laparoscopy With Vaginal Hysterectomy For Uterus > 250g       Physical Exam  side: right Knee  There were no vitals filed for this visit.  AxO x 3 in NAD.   Assistive Device: no device. Coordination and balance intact.  Normal bilateral upper and lower extremities.  No erythema, ecchymosis, temperature changes. No popliteal lymphadenopathy,  No overlying lesion  Mood: euthymic  Respirations non-labored  The incision is midline healing well with no signs of surrounding infection, dehiscence or drainage.   Neurovascular exam is at baseline.  No instability varus or valgus stressing the knee at 0, 30 or 60 degrees.  No instability in the AP plane at 90 degrees.  Range of motion: 0 degrees extension, 120 degrees flexion  5/5 hip flexion/knee extension/DF/PF/EHL  SILT in jeremy/saph/ per/tib distribution   Extremities warm and well perfused.  No lower extremity calf tenderness, warmth or swelling. Lower extremity well  perfused  2+ Femoral/DP/PT pulses bilaterally    Imaging:    I personally reviewed multiple views of the knee today in clinic. Status post side: right Total Knee Arthroplasty. The implant is well fixed, well aligned.  No evidence of florentino-implant fracture, lucency or dislocation.    Impression/Plan:  Libra Chery is doing well post-operatively and happy with the results of the operation.     S/P side: right Total Knee Arthroplasty  I talked with patient at length about activity precautions and progression of activities. The patient understands their permanent precautions.   3. Discussed the importance of dental prophylactic dental antibiotics lifelong. Patient may request medication refill through MyChart,       Pharmacy or surgeons office.  Dental antibiotic sent to the pharmacy today.  All questions answered.    Follow-up 5 years with x-rays at next visit.    LEANNA Ruiz, PA-C, ATC  Orthopedic Physician Assisant  Adult Reconstruction and Total Joint Replacement  General Orthopedics  Department of Orthopaedic Surgery  [place] [address]  Papertonaging preferred

## 2024-06-01 DIAGNOSIS — M54.12 CERVICAL RADICULITIS: Primary | ICD-10-CM

## 2024-06-03 RX ORDER — TOPIRAMATE 100 MG/1
100 TABLET, FILM COATED ORAL 2 TIMES DAILY
Qty: 180 TABLET | Refills: 0 | Status: SHIPPED | OUTPATIENT
Start: 2024-06-03

## 2024-06-19 ENCOUNTER — APPOINTMENT (OUTPATIENT)
Dept: OBSTETRICS AND GYNECOLOGY | Facility: CLINIC | Age: 64
End: 2024-06-19
Payer: COMMERCIAL

## 2024-06-19 VITALS
HEIGHT: 69 IN | DIASTOLIC BLOOD PRESSURE: 80 MMHG | SYSTOLIC BLOOD PRESSURE: 126 MMHG | BODY MASS INDEX: 33.92 KG/M2 | WEIGHT: 229 LBS

## 2024-06-19 DIAGNOSIS — Z00.00 ROUTINE GENERAL MEDICAL EXAMINATION AT A HEALTH CARE FACILITY: ICD-10-CM

## 2024-06-19 ASSESSMENT — ENCOUNTER SYMPTOMS
ENDOCRINE NEGATIVE: 0
CONSTITUTIONAL NEGATIVE: 0
HEMATOLOGIC/LYMPHATIC NEGATIVE: 0
CARDIOVASCULAR NEGATIVE: 0
MUSCULOSKELETAL NEGATIVE: 0
PSYCHIATRIC NEGATIVE: 0
EYES NEGATIVE: 0
NEUROLOGICAL NEGATIVE: 0
RESPIRATORY NEGATIVE: 0
GASTROINTESTINAL NEGATIVE: 0
ALLERGIC/IMMUNOLOGIC NEGATIVE: 0

## 2024-06-19 ASSESSMENT — PAIN SCALES - GENERAL: PAINLEVEL: 0-NO PAIN

## 2024-07-10 ENCOUNTER — APPOINTMENT (OUTPATIENT)
Dept: PRIMARY CARE | Facility: CLINIC | Age: 64
End: 2024-07-10
Payer: COMMERCIAL

## 2024-08-26 NOTE — PROGRESS NOTES
Recheck UCTD  /  FM  /  Arthritis  Doing well    HPI - doing ok.  Some days incr aching in her hands.    Intermittent shoulder pain.  She continues to have LBP - she still sees pain mgmt.  She had TKA since last OV, and it's doing well.   Her hands swell.  Intermittent AM stiffness.  No CP, resp, or GI.  No mouth sores.  ?dry eyes - it feels like something's in them at times.   She sees eye dr.  No rash or raynauds.  Intermittent hand numbness.      PE  NAD  RRR no r/m/g  CTA  No edema  No synovitis  Mild R hand diffuse tenderness    A/P - arthritis, FM, and (?)UCTD with DARLENE 1:40/+SSA  She sees pain mgmt  She can check with eye dr about OTC drops to try  Reviewed recent CMP and CBC  Reeval yrly or sooner PRN   [Normal] : temporomandibular joint is normal

## 2024-09-09 NOTE — PROGRESS NOTES
64-year-old -0-0-1 postmenopausal -American woman presents today for annual GYN exam without gynecologic complaints.    Subjective   Patient ID: Libra Chery is a 64 y.o. female who presents for Annual Exam (Last PAP= 2022 negative //Last MAMMO= 2024//Last Colonoscopy= ??? //Reshma. TRAVIS MA II/).  HPI    Review of Systems    Objective   Physical Exam  Exam conducted with a chaperone present.   HENT:      Head: Normocephalic.      Right Ear: External ear normal.      Left Ear: External ear normal.      Nose: Nose normal.      Mouth/Throat:      Mouth: Mucous membranes are moist.   Eyes:      Extraocular Movements: Extraocular movements intact.      Pupils: Pupils are equal, round, and reactive to light.   Cardiovascular:      Rate and Rhythm: Normal rate and regular rhythm.      Heart sounds: Normal heart sounds.   Pulmonary:      Effort: Pulmonary effort is normal.      Breath sounds: Normal breath sounds.   Chest:   Breasts:     Right: Normal.      Left: Normal.   Abdominal:      Palpations: Abdomen is soft.   Genitourinary:     General: Normal vulva.      Labia:         Right: No rash or lesion.         Left: No rash or lesion.       Vagina: Normal.      Adnexa: Right adnexa normal and left adnexa normal.   Musculoskeletal:         General: Normal range of motion.      Cervical back: Normal range of motion and neck supple.   Skin:     General: Skin is warm and dry.   Neurological:      General: No focal deficit present.      Mental Status: She is alert and oriented to person, place, and time.   Psychiatric:         Mood and Affect: Mood normal.         Behavior: Behavior normal.         A/P: APE     -  Pap due     -  Mammogram     -  PCP F/U     -  RTC 1 year

## 2024-09-09 NOTE — ADDENDUM NOTE
Addended by: BIJAN HERNÁNDEZ on: 9/9/2024 12:21 PM     Modules accepted: Orders, Level of Service

## 2024-09-26 ENCOUNTER — APPOINTMENT (OUTPATIENT)
Dept: PRIMARY CARE | Facility: CLINIC | Age: 64
End: 2024-09-26
Payer: COMMERCIAL

## 2024-09-30 ENCOUNTER — APPOINTMENT (OUTPATIENT)
Dept: PRIMARY CARE | Facility: CLINIC | Age: 64
End: 2024-09-30
Payer: COMMERCIAL

## 2024-09-30 DIAGNOSIS — Z23 ENCOUNTER FOR ADMINISTRATION OF VACCINE: Primary | ICD-10-CM

## 2024-09-30 PROCEDURE — 90471 IMMUNIZATION ADMIN: CPT | Performed by: INTERNAL MEDICINE

## 2024-09-30 PROCEDURE — 90662 IIV NO PRSV INCREASED AG IM: CPT | Performed by: INTERNAL MEDICINE

## 2024-10-08 ENCOUNTER — APPOINTMENT (OUTPATIENT)
Dept: PRIMARY CARE | Facility: CLINIC | Age: 64
End: 2024-10-08
Payer: COMMERCIAL

## 2024-10-22 RX ORDER — DICYCLOMINE HYDROCHLORIDE 20 MG/1
20 TABLET ORAL 4 TIMES DAILY PRN
Qty: 360 TABLET | Refills: 0 | OUTPATIENT
Start: 2024-10-22

## 2024-10-30 DIAGNOSIS — K58.9 IRRITABLE BOWEL SYNDROME, UNSPECIFIED TYPE: Primary | ICD-10-CM

## 2024-10-30 RX ORDER — DICYCLOMINE HYDROCHLORIDE 20 MG/1
20 TABLET ORAL 4 TIMES DAILY PRN
Qty: 100 TABLET | Refills: 0 | Status: SHIPPED | OUTPATIENT
Start: 2024-10-30 | End: 2024-11-29

## 2024-11-21 ENCOUNTER — APPOINTMENT (OUTPATIENT)
Dept: GASTROENTEROLOGY | Facility: CLINIC | Age: 64
End: 2024-11-21
Payer: COMMERCIAL

## 2024-11-21 VITALS — BODY MASS INDEX: 36.19 KG/M2 | RESPIRATION RATE: 22 BRPM | WEIGHT: 238.8 LBS | HEIGHT: 68 IN

## 2024-11-21 DIAGNOSIS — K58.9 IRRITABLE BOWEL SYNDROME, UNSPECIFIED TYPE: ICD-10-CM

## 2024-11-21 DIAGNOSIS — Z12.11 COLON CANCER SCREENING: Primary | ICD-10-CM

## 2024-11-21 PROCEDURE — 3008F BODY MASS INDEX DOCD: CPT

## 2024-11-21 PROCEDURE — 1036F TOBACCO NON-USER: CPT

## 2024-11-21 PROCEDURE — 99213 OFFICE O/P EST LOW 20 MIN: CPT

## 2024-11-21 RX ORDER — DICYCLOMINE HYDROCHLORIDE 20 MG/1
20 TABLET ORAL 4 TIMES DAILY PRN
Qty: 100 TABLET | Refills: 6 | Status: SHIPPED | OUTPATIENT
Start: 2024-11-21 | End: 2024-11-21 | Stop reason: SDUPTHER

## 2024-11-21 RX ORDER — DICYCLOMINE HYDROCHLORIDE 20 MG/1
20 TABLET ORAL 4 TIMES DAILY PRN
Qty: 360 TABLET | Refills: 3 | Status: SHIPPED | OUTPATIENT
Start: 2024-11-21 | End: 2025-11-16

## 2024-11-21 ASSESSMENT — ENCOUNTER SYMPTOMS
DIARRHEA: 1
TROUBLE SWALLOWING: 0
ANAL BLEEDING: 0
FEVER: 0
COUGH: 0
CONSTIPATION: 0
CHILLS: 0
BLOOD IN STOOL: 0
ABDOMINAL DISTENTION: 0
NAUSEA: 0
SHORTNESS OF BREATH: 0
VOMITING: 0
APPETITE CHANGE: 0
RECTAL PAIN: 0
FATIGUE: 0
ABDOMINAL PAIN: 0

## 2024-11-21 ASSESSMENT — PATIENT HEALTH QUESTIONNAIRE - PHQ9
SUM OF ALL RESPONSES TO PHQ9 QUESTIONS 1 AND 2: 0
2. FEELING DOWN, DEPRESSED OR HOPELESS: NOT AT ALL
1. LITTLE INTEREST OR PLEASURE IN DOING THINGS: NOT AT ALL

## 2024-11-21 ASSESSMENT — COLUMBIA-SUICIDE SEVERITY RATING SCALE - C-SSRS: 1. IN THE PAST MONTH, HAVE YOU WISHED YOU WERE DEAD OR WISHED YOU COULD GO TO SLEEP AND NOT WAKE UP?: NO

## 2024-11-21 ASSESSMENT — PAIN SCALES - GENERAL: PAINLEVEL_OUTOF10: 0-NO PAIN

## 2024-11-21 NOTE — PROGRESS NOTES
Subjective     History of Present Illness:   Libra Chery is a 64 y.o. female with PMHx of IBS-D, mood disorder, HTN, HLD, CKD 3 who presents to GI clinic for medication refills  Last seen by Dr. Dougherty 2024 for IBS, dicyclomine ordered.  Colonoscopy due 2025    Today, patient states when she has anxiety, she gets diarrhea.  Bentyl 4 times daily controls symptoms. When not anxious moves bowels normally with formed stools.  Denies any other GI complaints.  Denies constipation, dyspepsia, melena, hematochezia, dysphagia, unintentional weight loss    Denies ETOH or smoking, uses regular marijuana  Denies fxh GI cancer or IBD  Abdominal Surgeries: , hysterectomy    Last colonoscopy 2015 Dr. Dougherty for screening: Medium size lipoma ascending, sigmoid and descending diverticulosis, 2 mm benign polyp rectum  Denies EGD       Past Medical History  As per HPI.     Social History  she  reports that she has never smoked. She has never used smokeless tobacco. She reports current drug use. Drug: Marijuana.     Family History  her family history includes Breast cancer in her mother.     Review of Systems  Review of Systems   Constitutional:  Negative for appetite change, chills, fatigue and fever.   HENT:  Negative for trouble swallowing.    Respiratory:  Negative for cough and shortness of breath.    Gastrointestinal:  Positive for diarrhea (intermittent). Negative for abdominal distention, abdominal pain, anal bleeding, blood in stool, constipation, nausea, rectal pain and vomiting.       Allergies  No Known Allergies    Medications  Current Outpatient Medications   Medication Instructions    amoxicillin (Amoxil) 500 mg capsule Take 4 Tablets 1 Hour Prior to Dental Procedure or Cleaning.    cholecalciferol (VITAMIN D3) 50 mcg, oral, Daily    dicyclomine (BENTYL) 20 mg, oral, 4 times daily PRN    hydroxychloroquine (PLAQUENIL) 200 mg, oral, 2 times daily    lisinopriL-hydrochlorothiazide 20-25 mg tablet 1  tablet, oral, Daily    tiZANidine (Zanaflex) 4 mg tablet 1 tablet, oral, 2 times daily    topiramate (TOPAMAX) 100 mg, oral, 2 times daily        Objective   Visit Vitals  Resp 22      Physical Exam  Constitutional:       Appearance: Normal appearance. She is normal weight.   HENT:      Mouth/Throat:      Mouth: Mucous membranes are dry.      Pharynx: Oropharynx is clear.   Cardiovascular:      Rate and Rhythm: Normal rate and regular rhythm.   Pulmonary:      Effort: Pulmonary effort is normal.      Breath sounds: Normal breath sounds. No wheezing or rhonchi.   Abdominal:      General: Abdomen is flat. Bowel sounds are normal. There is no distension.      Palpations: Abdomen is soft. There is no hepatomegaly.      Tenderness: There is no abdominal tenderness. There is no guarding or rebound. Negative signs include Toro's sign.      Hernia: No hernia is present.   Musculoskeletal:         General: Normal range of motion.   Skin:     General: Skin is warm and dry.   Neurological:      General: No focal deficit present.      Mental Status: She is alert and oriented to person, place, and time.   Psychiatric:         Mood and Affect: Mood normal.         Behavior: Behavior normal.           Lab Results   Component Value Date    WBC 5.4 01/10/2024    WBC 4.6 04/18/2023    WBC CANCELED 04/18/2023    HGB 12.9 01/10/2024    HGB 13.2 04/18/2023    HGB CANCELED 04/18/2023    HCT 41.4 01/10/2024    HCT 41.8 04/18/2023    HCT CANCELED 04/18/2023     01/10/2024     04/18/2023    PLT CANCELED 04/18/2023     Lab Results   Component Value Date     01/10/2024     04/18/2023    NA CANCELED 04/18/2023    K 3.8 01/10/2024    K 3.7 04/18/2023    K CANCELED 04/18/2023     01/10/2024     (H) 04/18/2023    CL CANCELED 04/18/2023    CO2 27 01/10/2024    CO2 24 04/18/2023    CO2 CANCELED 04/18/2023    BUN 18 01/10/2024    BUN 15 04/18/2023    BUN CANCELED 04/18/2023    CREATININE 1.02 01/10/2024     CREATININE 1.07 (H) 04/18/2023    CREATININE CANCELED 04/18/2023    CALCIUM 9.8 01/10/2024    CALCIUM 8.6 04/18/2023    CALCIUM CANCELED 04/18/2023    PROT 7.6 01/10/2024    PROT 7.2 01/06/2023    PROT 7.7 07/08/2022    BILITOT 0.4 01/10/2024    BILITOT 0.4 01/06/2023    BILITOT 0.4 07/08/2022    ALKPHOS 75 01/10/2024    ALKPHOS 82 01/06/2023    ALKPHOS 83 07/08/2022    ALT 13 01/10/2024    ALT 18 01/06/2023    ALT 11 07/08/2022    AST 14 01/10/2024    AST 17 01/06/2023    AST 13 07/08/2022    GLUCOSE 94 01/10/2024    GLUCOSE 95 04/18/2023    GLUCOSE CANCELED 04/18/2023           Libra Chery is a 64 y.o. female who presents to GI clinic for IBS.    Irritable bowel syndrome  - continue bentyl as prescribed    Follow up annually or as needed    Colon cancer screening  Due 6/2025         Missy Fontanez, APRN-CNP

## 2024-12-23 ENCOUNTER — ANCILLARY PROCEDURE (OUTPATIENT)
Dept: URGENT CARE | Age: 64
End: 2024-12-23
Payer: COMMERCIAL

## 2024-12-23 ENCOUNTER — OFFICE VISIT (OUTPATIENT)
Dept: URGENT CARE | Age: 64
End: 2024-12-23
Payer: COMMERCIAL

## 2024-12-23 VITALS
SYSTOLIC BLOOD PRESSURE: 196 MMHG | HEIGHT: 68 IN | WEIGHT: 230 LBS | DIASTOLIC BLOOD PRESSURE: 110 MMHG | BODY MASS INDEX: 34.86 KG/M2

## 2024-12-23 DIAGNOSIS — M16.12 PRIMARY OSTEOARTHRITIS OF LEFT HIP: Primary | ICD-10-CM

## 2024-12-23 DIAGNOSIS — M25.552 PAIN OF LEFT HIP: ICD-10-CM

## 2024-12-23 PROCEDURE — 73502 X-RAY EXAM HIP UNI 2-3 VIEWS: CPT | Mod: LEFT SIDE | Performed by: PHYSICIAN ASSISTANT

## 2024-12-23 RX ORDER — KETOROLAC TROMETHAMINE 30 MG/ML
30 INJECTION, SOLUTION INTRAMUSCULAR; INTRAVENOUS ONCE
Status: COMPLETED | OUTPATIENT
Start: 2024-12-23 | End: 2024-12-23

## 2024-12-23 RX ORDER — METHOCARBAMOL 500 MG/1
1000 TABLET, FILM COATED ORAL NIGHTLY
Qty: 20 TABLET | Refills: 0 | Status: SHIPPED | OUTPATIENT
Start: 2024-12-23 | End: 2025-02-21

## 2024-12-23 RX ORDER — GUAIFENESIN 1200 MG
650 TABLET, EXTENDED RELEASE 12 HR ORAL EVERY 6 HOURS PRN
Qty: 30 CAPSULE | Refills: 0 | Status: SHIPPED | OUTPATIENT
Start: 2024-12-23

## 2024-12-23 RX ORDER — IBUPROFEN 600 MG/1
600 TABLET ORAL 4 TIMES DAILY PRN
Qty: 90 TABLET | Refills: 0 | Status: SHIPPED | OUTPATIENT
Start: 2024-12-23 | End: 2025-12-23

## 2024-12-23 ASSESSMENT — PAIN SCALES - GENERAL: PAINLEVEL_OUTOF10: 8

## 2024-12-23 NOTE — PATIENT INSTRUCTIONS
Alternate 600 mg ibuprofen and 650 mg acetaminophen every 3 hours    Take muscle relaxer as prescribed    Rest and ice the affected area for 20 min intervals    Follow up with primary care physician in 1-2 weeks

## 2024-12-23 NOTE — PROGRESS NOTES
Subjective   Patient ID: Libra Chery is a 64 y.o. female. They present today with a chief complaint of Other (Patient c/o left hip pain ).    History of Present Illness  Patient is a very pleasant 64-year-old -American female, past medical history of osteoarthritis, fibromyalgia, anxiety, depression, hypertension, hyperlipidemia, presented to the clinic for chief complaint of left hip pain.  Patient is reporting to clinic with complaints of left hip pain that is distributed around her groin and radiates around to her lateral hip.  States she has history of osteoarthritis in this hip and states it feels similar.  States she was having to use a crutch to ambulate prior to arrival today.  She not take anything at home for her symptoms.  States she is intermittently getting sharp shooting pains as well as concern for muscle spasms.  She denies any fall trauma or injury.  No numbness tingling or focal weakness.  No further complaints at this time.          Past Medical History  Allergies as of 12/23/2024    (No Known Allergies)       (Not in a hospital admission)         Past Medical History:   Diagnosis Date    Acute bronchitis 07/06/2023    Acute bronchospasm 07/06/2023    Chronic pain syndrome 02/15/2017    Chronic pain syndrome    Essential (primary) hypertension     Benign essential hypertension    Personal history of other diseases of the musculoskeletal system and connective tissue     History of osteoarthritis    Personal history of other medical treatment 02/28/2019    History of screening mammography    Personal history of other mental and behavioral disorders     History of anxiety disorder    Radiculopathy, lumbar region     Lumbar radiculopathy    Urinary frequency 07/06/2023    Vaginal discharge 07/06/2023    Vitamin D deficiency, unspecified     Vitamin D deficiency       Past Surgical History:   Procedure Laterality Date    CARPAL TUNNEL RELEASE  12/03/2013    Neuroplasty Decompression Median  "Nerve At Carpal Tunnel     SECTION, CLASSIC  12/10/2014     Section    OTHER SURGICAL HISTORY  12/10/2014    Laparoscopy With Vaginal Hysterectomy For Uterus > 250g        reports that she has never smoked. She has never used smokeless tobacco. She reports current drug use. Drug: Marijuana.    Review of Systems  Review of Systems                               Objective    Vitals:    24 1035   BP: (!) 196/110   Weight: 104 kg (230 lb)   Height: 1.727 m (5' 8\")     No LMP recorded. Patient is postmenopausal.    Physical Exam  General: Vitals Noted. No distress. Normocephalic.     HEENT: TMs normal, EOMI, normal conjunctiva, patent nares, Normal OP    Neck: Supple with no adenopathy.     Cardiac: Regular Rate and Rhythm. No murmur.     Pulmonary: Equal breath sounds bilaterally. No wheezes, rhonchi, or rales.    Abdomen: Soft, non-tender, with normal bowel sounds.     Musculoskeletal: Evaluation of the left hip does reveal increased pain with passive and active flexion of the hip.  There is no associated edema ecchymosis erythema or warmth.  There is some mild tenderness over the greater trochanter concerning for greater trochanteric bursitis.  Is neurovascular tact distally with cap refill less than 2 seconds.  She has full strength with hip flexion extension.  Full strength with dorsi and plantarflexion of the feet.  DP and PT pulses 2+ symmetric and intact.  Otherwise moves all extremities, no effusion, no edema.     Skin: No obvious rashes.  Procedures    Point of Care Test & Imaging Results from this visit    No results found.    Diagnostic study results (if any) were reviewed by Hernan Aguayo PA-C.    Assessment/Plan   Allergies, medications, history, and pertinent labs/EKGs/Imaging reviewed by Hernan Aguayo PA-C.     Medical Decision Making  Patient was seen eval in the clinic with inguinal left hip pain nontraumatic.  On exam patient is nontoxic well-appearing respect " comfortably no acute distress.  Vital signs are significant for a slightly elevated blood pressure of 196/110.  Stable otherwise, afebrile.  Chest is clear, heart is regular, belly is diffusely soft and nontender peer evaluation of left hip as above concerning for worsening underlying osteoarthritis versus greater trochanteric bursitis versus musculoskeletal strain versus spasm.  X-ray imaging left hip was obtained which reveals Moderate arthritic changes throughout the hip joint with no associated acute osseous abnormalities.  Patient's pain is likely secondary to arthritic changes potentially muscle spasm and her underlying fibromyalgia.  Provided 30 mg Toradol IM in the clinic she be discharged home in stable condition with instructions to rest and ice the affected area.  Advise she alternate on ibuprofen as needed.  Provided short course of methocarbamol to be used as needed at night.  Advised to follow close with her primary care physician as well as with her orthopedist for potentially definitive treatment.  I reviewed my impression, plan, strict return versus report to ED precautions with the patient.  She expresses understanding and agreement plan of care.      Orders and Diagnoses  Diagnoses and all orders for this visit:  Primary osteoarthritis of left hip  -     ketorolac (Toradol) injection 30 mg  -     ibuprofen 600 mg tablet; Take 1 tablet (600 mg) by mouth 4 times a day as needed for mild pain (1 - 3) (pain).  -     acetaminophen (Tylenol) 325 mg capsule; Take 2 capsules (650 mg) by mouth every 6 hours if needed for mild pain (1 - 3).  -     methocarbamol (Robaxin) 500 mg tablet; Take 2 tablets (1,000 mg) by mouth once daily at bedtime.  Pain of left hip  -     XR hip left with pelvis when performed 2 or 3 views; Future        Medical Admin Record  Administrations This Visit       ketorolac (Toradol) injection 30 mg       Admin Date  12/23/2024 Action  Given Dose  30 mg Route  intramuscular Documented  By  Guilherme Castañeda MA                    Follow Up Instructions  No follow-ups on file.    Patient disposition: Home    Electronically signed by Hernan Aguayo PA-C  11:45 AM

## 2025-01-04 ENCOUNTER — HOSPITAL ENCOUNTER (EMERGENCY)
Facility: HOSPITAL | Age: 65
Discharge: HOME | End: 2025-01-04
Payer: COMMERCIAL

## 2025-01-04 VITALS
OXYGEN SATURATION: 100 % | HEIGHT: 68 IN | WEIGHT: 239 LBS | SYSTOLIC BLOOD PRESSURE: 188 MMHG | DIASTOLIC BLOOD PRESSURE: 106 MMHG | RESPIRATION RATE: 16 BRPM | HEART RATE: 96 BPM | BODY MASS INDEX: 36.22 KG/M2 | TEMPERATURE: 98.1 F

## 2025-01-04 DIAGNOSIS — M25.552 PAIN OF LEFT HIP: ICD-10-CM

## 2025-01-04 DIAGNOSIS — M67.952 TENDINOPATHY OF LEFT GLUTEAL REGION: ICD-10-CM

## 2025-01-04 DIAGNOSIS — M54.30 SCIATICA, UNSPECIFIED LATERALITY: Primary | ICD-10-CM

## 2025-01-04 PROCEDURE — 2500000001 HC RX 250 WO HCPCS SELF ADMINISTERED DRUGS (ALT 637 FOR MEDICARE OP): Performed by: PHYSICIAN ASSISTANT

## 2025-01-04 PROCEDURE — 96372 THER/PROPH/DIAG INJ SC/IM: CPT | Performed by: PHYSICIAN ASSISTANT

## 2025-01-04 PROCEDURE — 99284 EMERGENCY DEPT VISIT MOD MDM: CPT

## 2025-01-04 PROCEDURE — 2500000004 HC RX 250 GENERAL PHARMACY W/ HCPCS (ALT 636 FOR OP/ED): Performed by: PHYSICIAN ASSISTANT

## 2025-01-04 RX ORDER — HYDROCODONE BITARTRATE AND ACETAMINOPHEN 5; 325 MG/1; MG/1
1 TABLET ORAL EVERY 6 HOURS PRN
Qty: 12 TABLET | Refills: 0 | Status: SHIPPED | OUTPATIENT
Start: 2025-01-04

## 2025-01-04 RX ORDER — HYDROCODONE BITARTRATE AND ACETAMINOPHEN 5; 325 MG/1; MG/1
1 TABLET ORAL ONCE
Status: COMPLETED | OUTPATIENT
Start: 2025-01-04 | End: 2025-01-04

## 2025-01-04 RX ORDER — ORPHENADRINE CITRATE 30 MG/ML
60 INJECTION INTRAMUSCULAR; INTRAVENOUS ONCE
Status: COMPLETED | OUTPATIENT
Start: 2025-01-04 | End: 2025-01-04

## 2025-01-04 RX ORDER — PREDNISONE 20 MG/1
TABLET ORAL
Qty: 20 TABLET | Refills: 0 | Status: SHIPPED | OUTPATIENT
Start: 2025-01-04 | End: 2025-01-14

## 2025-01-04 RX ORDER — PREDNISONE 20 MG/1
60 TABLET ORAL ONCE
Status: COMPLETED | OUTPATIENT
Start: 2025-01-04 | End: 2025-01-04

## 2025-01-04 RX ORDER — NAPROXEN 250 MG/1
500 TABLET ORAL ONCE
Status: COMPLETED | OUTPATIENT
Start: 2025-01-04 | End: 2025-01-04

## 2025-01-04 RX ORDER — ORPHENADRINE CITRATE 100 MG/1
100 TABLET, EXTENDED RELEASE ORAL 2 TIMES DAILY PRN
Qty: 20 TABLET | Refills: 0 | Status: SHIPPED | OUTPATIENT
Start: 2025-01-04 | End: 2025-01-14

## 2025-01-04 RX ADMIN — ORPHENADRINE CITRATE 60 MG: 60 INJECTION INTRAMUSCULAR; INTRAVENOUS at 14:16

## 2025-01-04 RX ADMIN — PREDNISONE 60 MG: 20 TABLET ORAL at 14:16

## 2025-01-04 RX ADMIN — HYDROCODONE BITARTRATE AND ACETAMINOPHEN 1 TABLET: 5; 325 TABLET ORAL at 14:16

## 2025-01-04 RX ADMIN — NAPROXEN 500 MG: 250 TABLET ORAL at 14:15

## 2025-01-04 ASSESSMENT — LIFESTYLE VARIABLES
TOTAL SCORE: 0
EVER HAD A DRINK FIRST THING IN THE MORNING TO STEADY YOUR NERVES TO GET RID OF A HANGOVER: NO
HAVE YOU EVER FELT YOU SHOULD CUT DOWN ON YOUR DRINKING: NO
HAVE YOU EVER FELT YOU SHOULD CUT DOWN ON YOUR DRINKING: NO
HAVE PEOPLE ANNOYED YOU BY CRITICIZING YOUR DRINKING: NO
HAVE PEOPLE ANNOYED YOU BY CRITICIZING YOUR DRINKING: NO
EVER FELT BAD OR GUILTY ABOUT YOUR DRINKING: NO
TOTAL SCORE: 0
EVER HAD A DRINK FIRST THING IN THE MORNING TO STEADY YOUR NERVES TO GET RID OF A HANGOVER: NO
EVER FELT BAD OR GUILTY ABOUT YOUR DRINKING: NO

## 2025-01-04 ASSESSMENT — PAIN DESCRIPTION - DESCRIPTORS: DESCRIPTORS: RADIATING

## 2025-01-04 ASSESSMENT — PAIN DESCRIPTION - ORIENTATION: ORIENTATION: LEFT

## 2025-01-04 ASSESSMENT — COLUMBIA-SUICIDE SEVERITY RATING SCALE - C-SSRS
6. HAVE YOU EVER DONE ANYTHING, STARTED TO DO ANYTHING, OR PREPARED TO DO ANYTHING TO END YOUR LIFE?: NO
2. HAVE YOU ACTUALLY HAD ANY THOUGHTS OF KILLING YOURSELF?: NO
1. IN THE PAST MONTH, HAVE YOU WISHED YOU WERE DEAD OR WISHED YOU COULD GO TO SLEEP AND NOT WAKE UP?: NO

## 2025-01-04 ASSESSMENT — PAIN DESCRIPTION - LOCATION: LOCATION: HIP

## 2025-01-04 ASSESSMENT — PAIN - FUNCTIONAL ASSESSMENT: PAIN_FUNCTIONAL_ASSESSMENT: 0-10

## 2025-01-04 ASSESSMENT — PAIN DESCRIPTION - PROGRESSION: CLINICAL_PROGRESSION: NOT CHANGED

## 2025-01-04 ASSESSMENT — PAIN DESCRIPTION - PAIN TYPE: TYPE: ACUTE PAIN

## 2025-01-04 ASSESSMENT — PAIN SCALES - GENERAL: PAINLEVEL_OUTOF10: 10 - WORST POSSIBLE PAIN

## 2025-01-06 NOTE — PROGRESS NOTES
Verbal consent of the patient and/or verbal parental consent for patients under the age of 18 have been obtained to conduct a physical examination at this office visit.    New patient  History Of Present Illness  25 Libra Chery is a 64 y.o. female who presents for an evaluation of their Left HIP.      All previous Progress Notes and imaging results related to this patients chief complaint have been reviewed in preparation for this examination.    Past Medical History  She has a past medical history of Acute bronchitis (2023), Acute bronchospasm (2023), Chronic pain syndrome (02/15/2017), Essential (primary) hypertension, Personal history of other diseases of the musculoskeletal system and connective tissue, Personal history of other medical treatment (2019), Personal history of other mental and behavioral disorders, Radiculopathy, lumbar region, Urinary frequency (2023), Vaginal discharge (2023), and Vitamin D deficiency, unspecified.    Surgical History  She has a past surgical history that includes Carpal tunnel release (2013);  section, classic (12/10/2014); and Other surgical history (12/10/2014).     Social History  She reports that she has never smoked. She has never used smokeless tobacco. She reports current drug use. Drug: Marijuana. No history on file for alcohol use.    Family History  Family History   Problem Relation Name Age of Onset    Breast cancer Mother          Allergies  Patient has no known allergies.    Review of Systems  CONSTITUTIONAL:   Negative for weight change, loss of appetite, fatigue, weakness, fever, chills, night sweats, headaches .           HEENT:   Negative for cold, cough, sore throat, sinus pain, swollen lymph nodes.           OPHTHALMOLOGY:   Negative for diminished vision, blurred vision, loss of vision, double vision.           ALLERGY:   Negative for runny nose, scratchy throat, sinus congestion, rash, facial pressure,  nasal congestion, post-nasal drip.           CARDIOLOGY:   Negative for chest pain, palpitations, murmurs, irregular heart beat, shortness of breath, leg edema, dyspnea on exertion, fatigue, dizziness.           RESPIRATORY:   Negative for chest pain, shortness of breath, swelling of the legs, asthma/copd, chest congestion, pain with breathing .           GASTROENTEROLOGY:   Negative for nausea, vomitting, heartburn, constipation, diarrhea, blood in stool, change in bowel habits, black stool.           HEMATOLOGY/LYMPH:   Negative for fatigue, loss of appetitie, easy bruising, easy bleeding, anemia, abnormal bleeding, slow healing.           ENDOCRINOLOGY:   Negative for polyuria, polydipsia, polyphagia, fatigue, weight loss, weight gain, cold intolerance, heat intolerance, diabetes.           MUSCULOSKELETAL:   Positive  for Left HIP        DERMATOLOGY:   Negative for rash, bruising.           NEUROLOGY:   Negative for tingling, numbness, gait abnormality, paresthesias, weakness, sciatica.        Examination:  Left  Hip and/or Pelvis  Edema: Positive.   Ecchymosis/Bruising: Negative.   Percussion Test: Negative.   Tuning Fork Test: Negative.   Orientation: Symmetrical.     ROM:   Positive Internal Rotation (30-35 degrees) decreased, causes pain  Positive External Rotation (45-60 degrees) decreased, causes pain  Positive Flexion (120 degrees) decreased, causes pain  Positive Extension (15-30 degrees) decreased, causes pain  Positive ABduction (45-50 degrees) decreased, causes pain  Positive ADduction (20-30 degrees) decreased, causes pain  FROM Sacral Flexion and Sacral Extension.            Muscle Strength:   +5/+5 Hamstring Flexion  +5/+5 Quadricep Extension         Positive {BJJSTRENGTH:03007} Hip Flexion Causes pain  Positive {BJJSTRENGTH:17323}Hip Extension Causes pain                  Positive {BJJSTRENGTH:32524} Hip ABduction away from body Causes pain  Positive {BJJSTRENGTH:98919} Hip ADduction towards body  Causes pain          Positive {BJJSTRENGTH:63483} Hip Internal Rotation at 90 Degrees Causes pain  Positive {BJJSTRENGTH:22646} Hip External Rotation at 90 Degrees Causes pain                  Positive {BJJSTRENGTH:78958} Hip Internal Rotation at 0 Degrees Causes pain  Positive {BJJSTRENGTH:79506} Hip External Rotation at 0 Degrees Causes pain    DTR/Neurological:  Sensation Intact, 2-Point Discrimination: Negative.            Palpation:  Positive  tender to palpation over {BJJLOCATION:98417} {BJJLOCPROXIMITY:05172} {BJJLOCHIPLABRUM:74762}           Vascular:   Negative: Normal Pulses   +2/+4, Femoral Artery, DP, PT  Capillary Refill < 2 seconds.            Function Tests:  Test for Rectus Femoris Contracture: Positive  Hip Extension Test: Positive    Iliotibial Tract Test: Positive    Benton :  Positive    Benton Test:  Positive    Jaymie Test: Positive              Hip/Pelvis - Flexibility:  Hamstring Positive   Hip Flexor  Positive    IT-Band  Positive          Hip/Pelvis - Hip Contractures:  Finger Tip Test: Negative.   Benton Test: Negative.   Benton  Test: Negative.   Piriformis Test: Negative.       Hip/Pelvis - Hip Dysplasia:  Trochanter Irritation Sign: Negative.   Galeazzi Test: Negative.   Kalchschmidt Test: Negative.            Hip/Pelvis - Impingement/Labrum:  KAMAR Test:  Positive         FADIR Test:  Positive        Hip Scouring Test:  Positive        Phillips's Test:  Positive        Posterior Labrum (Posterior Margin) Test:  Positive        Posterior Impingement (Posterior Labrum) [Torque] Test:  Positive        Anterior Impingement (Anterior Labrum) Test:  Positive   Psoas Sign: Positive                     Hip/Pelvis - IT Band Syndrome:  Jaymie's (Iliotibial Tract) Test: Negative.   Elias Compression Test: Negative.   J-Sign: Negative.            Hip/Pelvis - SFE:  Drehmann Test: Negative.            Hip/Pelvis - Trochanteric/Pelvis Muscle Function:  Trendelenburg/Duchenne Sign: Negative.    Duchenne Sign: Negative.      Leg Length:  Leg Length Supine: Positive {BJJLEGLENGTH:58637}   Leg Length Supine to Seated (Derbolowsky Sign): Positive {BJJLEGLENGTH:74034}     Feet/Foot:   Positive {BJJLOCATION:62949} {BJJFEETVALGUSVARUS:74103}        Imaging and Diagnostics Review:  XR hip left with pelvis when performed 2 or 3 views  Order date: 12/23/2024  Authorizing: Hernan Patrick PA-C  Final Result. Last edit: Interface, Radiology Results In - 12/23/2024     Narrative & Impression    Interpreted By:  Tai Aldridge,   STUDY:  XR HIP LEFT WITH PELVIS WHEN PERFORMED 2 OR 3 VIEWS; ;  12/23/2024  11:09 am      INDICATION:  Signs/Symptoms:nontraumatic pain; hx of OA.      ,M25.552 Pain in left hip      COMPARISON:  01/19/2011      ACCESSION NUMBER(S):  [id number]      ORDERING CLINICIAN:  HERNAN PATRICK      FINDINGS:  The pelvic ring is intact without acute fracture or widening of the  pubic symphysis or sacroiliac joints. There is no acute fracture of  the proximal right or left femur or hip dislocation. There is mild  subchondral sclerosis of the right hip and spurring of the inferior  acetabulum. There is mild spurring of the lateral left acetabulum and  left femoral head neck junction and mild subchondral sclerosis of the  left hip. There is enthesopathic change at the bilateral greater  trochanters.      IMPRESSION:  Slight progression of bilateral mild hip osteoarthrosis (left >  right).    New/progressed Enthesopathic changes at the greater trochanters that  could signify insertional gluteal tendinosis.      MACRO:  None.      Signed by: Tai Aldridge 12/23/2024 11:44 AM  Dictation workstation:   [id number]       Assessment   1. Left hip pain        2. Bilateral hip joint arthritis        3. Muscle strain of gluteal region, left, initial encounter            Treatment or Intervention:  May use to alternate ice and moist heat as needed  ,   Start into Physical Therapy 1-2 times a week  for 8-10 weeks with manual therapy as well as dry needling and IASTM  ,   Reviewed home exercises to be performed by the patient routinely, Stressed the importance of wearing shoes with good stability control to help with the biomechanics affecting the lower legs  ,   Stressed the importance of wearing full foot insoles to help with the biomechanics affecting the lower legs  ,   Recommendation over-the-counter calcium with vitamin-D 2 -3000+ milligrams a day, as well as OTC symphytum as directed daily to promote bony healing, in addition to a daily multivitamin.  ,   Recommendation over-the-counter curcumin, turmeric, boswellia, as well as egg shell membrane as directed to aid with joint inflammation.  ,   Recommendation over-the-counter Move Free for joint health., Patient advised regarding the risks and/or potential adverse reactions and/or side effects of any prescribed medications along with any over-the-counter medications or any supplements used. Patient advised to seek immediate medical care if any adverse reactions occur. The patient and/or patient(s) parent(s) verbalized their understanding.  ,   Discussed in detail with the patient to the level of their understanding the possibility in the future of regenerative injections versus corticosteroid injections  ,   Discussed in detail with the patient to the level of their understanding the possibility in the future will perform OMT through the hip flexor(s) as well as associated musculature including the aDductors, pectineus, obturator, and gamellus.  ,   Discussed possibility in the future of LEFT hip MR Arthrogram,   At the patient's next office visit, will provide appropriate __ millimeter heel lift to be placed in the RIGHT/LEFT shoe to accommodate for leg length discrepancy found on standing erect pelvis xray ,   MRI of the LEFT hip(s) to rule out ligament or tendon injury versus stress fracture versus labral injury versus pubic bone fracture versus other   , and   Follow-up after LEFT hip MRI or in 2-4 weeks for a reevaluation or sooner as needed        NEERAJ LIRA on 1/6/25 at 7:41 AM.     Edmund Stewart DO, FAOASM

## 2025-01-07 ENCOUNTER — APPOINTMENT (OUTPATIENT)
Dept: SPORTS MEDICINE | Facility: CLINIC | Age: 65
End: 2025-01-07
Payer: COMMERCIAL

## 2025-01-07 DIAGNOSIS — M16.0 BILATERAL HIP JOINT ARTHRITIS: ICD-10-CM

## 2025-01-07 DIAGNOSIS — M25.552 LEFT HIP PAIN: ICD-10-CM

## 2025-01-07 DIAGNOSIS — S76.012A MUSCLE STRAIN OF GLUTEAL REGION, LEFT, INITIAL ENCOUNTER: ICD-10-CM

## 2025-01-08 ENCOUNTER — APPOINTMENT (OUTPATIENT)
Dept: PRIMARY CARE | Facility: CLINIC | Age: 65
End: 2025-01-08
Payer: COMMERCIAL

## 2025-01-08 ENCOUNTER — TELEPHONE (OUTPATIENT)
Dept: OBSTETRICS AND GYNECOLOGY | Facility: CLINIC | Age: 65
End: 2025-01-08

## 2025-01-08 NOTE — TELEPHONE ENCOUNTER
Contacted pt  Name and  verified  Order reviewed with pt, mammogram and location verified  Pt made annual appt with Nadeem at [facility]  Pt verbalized understanding.  No further questions or concerns at this time.

## 2025-01-09 ENCOUNTER — APPOINTMENT (OUTPATIENT)
Dept: RADIOLOGY | Facility: CLINIC | Age: 65
End: 2025-01-09
Payer: COMMERCIAL

## 2025-01-10 DIAGNOSIS — M35.9 UNDIFFERENTIATED CONNECTIVE TISSUE DISEASE (MULTI): ICD-10-CM

## 2025-01-10 RX ORDER — HYDROXYCHLOROQUINE SULFATE 200 MG/1
TABLET, FILM COATED ORAL 2 TIMES DAILY
Qty: 180 TABLET | Refills: 0 | Status: SHIPPED | OUTPATIENT
Start: 2025-01-10

## 2025-01-15 DIAGNOSIS — M25.552 LEFT HIP PAIN: Primary | ICD-10-CM

## 2025-01-16 ENCOUNTER — OFFICE VISIT (OUTPATIENT)
Dept: ORTHOPEDIC SURGERY | Facility: CLINIC | Age: 65
End: 2025-01-16
Payer: COMMERCIAL

## 2025-01-16 ENCOUNTER — HOSPITAL ENCOUNTER (OUTPATIENT)
Dept: RADIOLOGY | Facility: CLINIC | Age: 65
Discharge: HOME | End: 2025-01-16
Payer: COMMERCIAL

## 2025-01-16 DIAGNOSIS — M54.32 SCIATICA OF LEFT SIDE: Primary | ICD-10-CM

## 2025-01-16 DIAGNOSIS — M25.552 LEFT HIP PAIN: ICD-10-CM

## 2025-01-16 PROCEDURE — 99214 OFFICE O/P EST MOD 30 MIN: CPT | Performed by: PHYSICIAN ASSISTANT

## 2025-01-16 PROCEDURE — 1036F TOBACCO NON-USER: CPT | Performed by: PHYSICIAN ASSISTANT

## 2025-01-16 RX ORDER — CYCLOBENZAPRINE HCL 10 MG
10 TABLET ORAL 3 TIMES DAILY PRN
Qty: 30 TABLET | Refills: 0 | Status: SHIPPED | OUTPATIENT
Start: 2025-01-16 | End: 2025-01-26

## 2025-01-16 RX ORDER — MELOXICAM 15 MG/1
15 TABLET ORAL DAILY
Qty: 60 TABLET | Refills: 0 | Status: SHIPPED | OUTPATIENT
Start: 2025-01-16 | End: 2025-03-17

## 2025-01-16 ASSESSMENT — ENCOUNTER SYMPTOMS
DIZZINESS: 0
COLOR CHANGE: 0
NERVOUS/ANXIOUS: 0
FEVER: 0
BLOOD IN STOOL: 0
WEAKNESS: 0
DYSURIA: 0
VOMITING: 0
HEMATOLOGIC/LYMPHATIC NEGATIVE: 1
COUGH: 0
HEMATURIA: 0
ARTHRALGIAS: 0
BACK PAIN: 0
ACTIVITY CHANGE: 0
CONSTITUTIONAL NEGATIVE: 1
FATIGUE: 0
ALLERGIC/IMMUNOLOGIC NEGATIVE: 1
CHEST TIGHTNESS: 0
CHILLS: 0
PALPITATIONS: 0
SHORTNESS OF BREATH: 0
NAUSEA: 0
CARDIOVASCULAR NEGATIVE: 1
ABDOMINAL PAIN: 0
RESPIRATORY NEGATIVE: 1
EYES NEGATIVE: 1
CONFUSION: 0
ENDOCRINE NEGATIVE: 1
WHEEZING: 0
JOINT SWELLING: 1
AGITATION: 0
LIGHT-HEADEDNESS: 0
FREQUENCY: 0
DIFFICULTY URINATING: 0
WOUND: 0

## 2025-01-16 ASSESSMENT — PAIN - FUNCTIONAL ASSESSMENT: PAIN_FUNCTIONAL_ASSESSMENT: 0-10

## 2025-01-16 ASSESSMENT — PAIN SCALES - GENERAL: PAINLEVEL_OUTOF10: 10 - WORST POSSIBLE PAIN

## 2025-01-16 NOTE — PROGRESS NOTES
LEANNA Ruiz, PALeeannC, ATC  Adult Reconstruction and Joint Replacement Surgery  Phone: [phone]     Fax:[phone]      Chief Complaint   Patient presents with    Left Hip - Follow-up       HPI    Libra Chery is a pleasant 64 y.o. year-old female who is seen today for evaluation of  ongoing side: left hip osteoarthritis.  She reports that she does have some neurogenic pain on the anterior and posterior aspect of her left leg.  This does not radiate past her knee.  She does have some groin pain but this is very minimal.  She was seen in the emergency room and urgent care over the course of the last 2 weeks and was placed on Flexeril and a steroid pack which did not help.  She does have a history of fibromyalgia.  She also has a history of neurogenic claudication due to spinal stenosis.  Review of Systems   Constitutional: Negative.  Negative for activity change, chills, fatigue and fever.   HENT: Negative.     Eyes: Negative.    Respiratory: Negative.  Negative for cough, chest tightness, shortness of breath and wheezing.    Cardiovascular: Negative.  Negative for palpitations.   Gastrointestinal:  Negative for abdominal pain, blood in stool, nausea and vomiting.   Endocrine: Negative.  Negative for cold intolerance and polyuria.   Genitourinary: Negative.  Negative for difficulty urinating, dysuria, frequency, hematuria and urgency.   Musculoskeletal:  Positive for gait problem and joint swelling. Negative for arthralgias and back pain.   Skin: Negative.  Negative for color change, pallor, rash and wound.   Allergic/Immunologic: Negative.  Negative for environmental allergies.   Neurological:  Negative for dizziness, weakness and light-headedness.   Hematological: Negative.    Psychiatric/Behavioral:  Negative for agitation, confusion and suicidal ideas. The patient is not nervous/anxious.    All other systems reviewed and are negative.      There were no vitals filed for this visit.    Past  Medical History:   Diagnosis Date    Acute bronchitis 07/06/2023    Acute bronchospasm 07/06/2023    Chronic pain syndrome 02/15/2017    Chronic pain syndrome    Essential (primary) hypertension     Benign essential hypertension    Personal history of other diseases of the musculoskeletal system and connective tissue     History of osteoarthritis    Personal history of other medical treatment 02/28/2019    History of screening mammography    Personal history of other mental and behavioral disorders     History of anxiety disorder    Radiculopathy, lumbar region     Lumbar radiculopathy    Urinary frequency 07/06/2023    Vaginal discharge 07/06/2023    Vitamin D deficiency, unspecified     Vitamin D deficiency     Patient Active Problem List   Diagnosis    Abnormal antinuclear antibody titer    Abnormal blood chemistry    Abnormal finding on breast imaging    Adjustment disorder with anxious mood    Ankle sprain    Anxiety    Arthritis    Dermatitis    Arthritis of right knee    Cervical high risk HPV (human papillomavirus) test positive    Cervical radiculitis    Contusion of right hand    Cough    Depression    Easy bruising    Essential hypertension    Fall    Hyperlipidemia    Irritable bowel syndrome    Myalgia    Back muscle spasm    Back pain    Chronic pain    Fibromyalgia    Hip pain    Limb pain    Neurogenic claudication due to lumbar spinal stenosis    Pain in joint, lower leg    Post-traumatic osteoarthritis of right knee    Postmenopausal disorder    Status post partial thyroidectomy    JOSIE (stress urinary incontinence, female)    Thyroid nodule    Undifferentiated connective tissue disease (Multi)    Urinary incontinence    Vitamin D deficiency    Weight loss    Carpal tunnel syndrome of left wrist    Extensor tenosynovitis of wrist    Lumbar spondylosis    Pain of both hip joints    Strain of left wrist    Trochanteric bursitis, left hip    Stage 3a chronic kidney disease (Multi)    Need for hepatitis  C screening test    Routine general medical examination at a health care facility    Multinodular goiter    Colon cancer screening       Medication Documentation Review Audit       Reviewed by Tammie Thapa MA (Medical Assistant) on 12/23/24 at 1035      Medication Order Taking? Sig Documenting Provider Last Dose Status   amoxicillin (Amoxil) 500 mg capsule [id number]  Take 4 Tablets 1 Hour Prior to Dental Procedure or Cleaning. Marion Rothman PA-C  Active   cholecalciferol (Vitamin D3) 50 MCG (2000 UT) tablet [id number] No Take 1 tablet (50 mcg) by mouth once daily. Historical Provider, MD Taking Active   dicyclomine (Bentyl) 20 mg tablet [id number]  Take 1 tablet (20 mg) by mouth 4 times a day as needed (abdominal cramping). Missy Fontanez APRN-CNP  Active   hydroxychloroquine (Plaquenil) 200 mg tablet [id number] No Take 1 tablet (200 mg) by mouth 2 times a day. Daphney Maria MD Taking Active   lisinopriL-hydrochlorothiazide 20-25 mg tablet [id number] No Take 1 tablet by mouth once daily. Beth Ahn PA-C Taking Active   tiZANidine (Zanaflex) 4 mg tablet [id number] No Take 1 tablet (4 mg) by mouth 2 times a day. Historical Provider, MD Taking Active   topiramate (Topamax) 100 mg tablet [id number]  TAKE ONE TABLET BY MOUTH TWO TIMES A DAY Nellie Richards MD  Active                    No Known Allergies    Social History     Socioeconomic History    Marital status: Single     Spouse name: Not on file    Number of children: Not on file    Years of education: Not on file    Highest education level: Not on file   Occupational History    Not on file   Tobacco Use    Smoking status: Never    Smokeless tobacco: Never   Substance and Sexual Activity    Alcohol use: Not on file    Drug use: Yes     Types: Marijuana    Sexual activity: Not on file   Other Topics Concern    Not on file   Social History Narrative    Not on file     Social Drivers of Health     Financial Resource Strain: Not on file   Food Insecurity: Not  on file   Transportation Needs: Not on file   Physical Activity: Not on file   Stress: Not on file   Social Connections: Not on file   Intimate Partner Violence: Not on file   Housing Stability: Not on file       Past Surgical History:   Procedure Laterality Date    CARPAL TUNNEL RELEASE  2013    Neuroplasty Decompression Median Nerve At Carpal Tunnel     SECTION, CLASSIC  12/10/2014     Section    OTHER SURGICAL HISTORY  12/10/2014    Laparoscopy With Vaginal Hysterectomy For Uterus > 250g       There is no height or weight on file to calculate BMI.    A1C  Lab Results   Component Value Date    HGBA1C 5.8 (H) 01/10/2024    JAEMKIBN9H 120 01/10/2024       Physical Exam  side: left Hip:  General: Well-appearing female in no acute distress.  Awake, alert and oriented.  Pleasant and cooperative.  Respiratory: Non-labored breathing  Mood: Euthymic   Gait: Antalgic  Assistive Device: crutches  Skin: warm, dry and intact without lesions    Range of Motion:    Flexion: 120   Extension 0  internal rotation: 40 mild pain  external rotation: 40 mild pain  abduction: 40  Hip Flexor Strength: 5/5  Abductor Strength: 5/5  Adductor Strength: 5/5  Limb Length Discrepancy: No  negative slight positive/Stinchfield test.  negative Log Roll  There is a Straight Leg Raise.  Tenderness: Posterior, anterior, groin  SILT in a jeremy/saph/per/tib distribution  Motor function: Able to fire TA, EHL, G/S  Pulses: Palpable DP/PTpulse    Imaging:     3 view Hip and Pelvis were reviewed and interpreted in clinic today. The findings were reviewed with the patient. There are Mild degenerative changes with associated joint space narrowing, subchondral sclerosis, and osteophyte formation and cystic changes. No evidence of fracture, AVN, dislocation, osteomyelitis.    Impression/Plan:  Libra Chery and SAVI discussed the etiology of symptoms.  We discussed in detail a treatment plan that he/she is comfortable with.    Mild  Osteoarthritis side: left Hip/ acute exacerbation of spinal stenosis with claudication.. We reviewed an evidence-based approach to osteoarthritis of the hip/spine.    I discussed with Kat today that this sounds more neurogenic in nature than it is hip arthritis.  Her x-rays show mild arthritic changes.  We did discuss doing a trial of Flexeril and meloxicam.  She has also been given a PT order for therapy today.  I also recommend follow-up with her pain management doctor.    All questions were answered.    Follow-up PRN.     LEANNA Ruiz PA-C ATC  Orthopedic Physician Assisant  Adult Reconstruction and Total Joint Replacement  General Orthopedics  Department of Orthopaedic Surgery  [place] [address]  Haiko messaging preferred       LEANNA Ruiz PA-C, YADIRA  Orthopedic Physician Assisant  Adult Reconstruction and Total Joint Replacement  General Orthopedics  Department of Orthopaedic Surgery  [place] [address]  Haiko messaging preferred

## 2025-01-22 ENCOUNTER — APPOINTMENT (OUTPATIENT)
Dept: RADIOLOGY | Facility: CLINIC | Age: 65
End: 2025-01-22
Payer: COMMERCIAL

## 2025-01-23 ENCOUNTER — TELEPHONE (OUTPATIENT)
Dept: PAIN MEDICINE | Facility: CLINIC | Age: 65
End: 2025-01-23
Payer: COMMERCIAL

## 2025-01-23 NOTE — TELEPHONE ENCOUNTER
"Attempted to contact patient at request of RN     \"pt need a follow up with Dr. Richards. please contact to schedule can not give medication that was planned a year ago that she didn't end up taking.\"    All calls go directly to . Left s Monday-Thursday.    "

## 2025-01-30 ENCOUNTER — APPOINTMENT (OUTPATIENT)
Dept: PAIN MEDICINE | Facility: CLINIC | Age: 65
End: 2025-01-30
Payer: COMMERCIAL

## 2025-01-30 DIAGNOSIS — M54.12 CERVICAL RADICULITIS: Primary | ICD-10-CM

## 2025-01-30 DIAGNOSIS — M47.816 LUMBAR FACET ARTHROPATHY: ICD-10-CM

## 2025-01-30 PROCEDURE — 99204 OFFICE O/P NEW MOD 45 MIN: CPT | Performed by: PAIN MEDICINE

## 2025-01-30 RX ORDER — TOPIRAMATE 100 MG/1
100 TABLET, FILM COATED ORAL 2 TIMES DAILY
Qty: 180 TABLET | Refills: 0 | Status: SHIPPED | OUTPATIENT
Start: 2025-01-30 | End: 2025-04-30

## 2025-01-30 RX ORDER — TIZANIDINE 4 MG/1
4 TABLET ORAL 2 TIMES DAILY
Qty: 180 TABLET | Refills: 0 | Status: SHIPPED | OUTPATIENT
Start: 2025-01-30 | End: 2025-04-30

## 2025-01-30 ASSESSMENT — PAIN - FUNCTIONAL ASSESSMENT: PAIN_FUNCTIONAL_ASSESSMENT: 0-10

## 2025-01-30 ASSESSMENT — PAIN SCALES - GENERAL: PAINLEVEL_OUTOF10: 7

## 2025-01-30 NOTE — PROGRESS NOTES
Subjective   Patient ID: Libra Chery is a 64 y.o. female presenting with left low back and hip pain.      HPI:   Patient states she had sudden onset left-sided low back and hip pain starting in tdecember.  Pain radiates down anterior and lateral aspect of the leg to the level of the knee.  Patient endorses numbness around the knee as well as paresthesias in the anterior thigh.  Pain is worse with movement.  Was referred to pain management by orthopedic surgery.  She had a recent emergency room visit in which she was prescribed Flexeril and Medrol Dosepak with no relief.  Currently taking OTC for pain.  Not on any neuropathic agents.  Recent hip x-ray showing bilateral mid hip osteoarthritis worse on left than right and new/progressed Enthesopathic changes at the greater trochanters that could signify insertional gluteal tendinosis.  No recent lumbar spine imaging.    Physical Therapy: The patient has not done physical therapy within the past six months  Other Conservative Measures she has tried: Injections  Classes of medications tried in the past: Acetaminophen, NSAIDs, and Muscle Relaxants      Last Urine Drug Screen:  No results found for this or any previous visit (from the past 8760 hours).  N/A      Review of Systems   13-point ROS done and negative except for HPI.     Current Outpatient Medications   Medication Instructions    acetaminophen (TYLENOL) 650 mg, oral, Every 6 hours PRN    amoxicillin (Amoxil) 500 mg capsule Take 4 Tablets 1 Hour Prior to Dental Procedure or Cleaning.    cholecalciferol (VITAMIN D3) 50 mcg, oral, Daily    cyclobenzaprine (FLEXERIL) 10 mg, oral, 3 times daily PRN    dicyclomine (BENTYL) 20 mg, oral, 4 times daily PRN    HYDROcodone-acetaminophen (Norco) 5-325 mg tablet 1 tablet, oral, Every 6 hours PRN    hydroxychloroquine (Plaquenil) 200 mg tablet oral, 2 times daily    ibuprofen 600 mg, oral, 4 times daily PRN    lisinopriL-hydrochlorothiazide 20-25 mg tablet 1 tablet, oral,  Daily    meloxicam (MOBIC) 15 mg, oral, Daily    methocarbamol (ROBAXIN) 1,000 mg, oral, Nightly    orphenadrine (NORFLEX) 100 mg, oral, 2 times daily PRN, Do not crush, chew, or split.    tiZANidine (Zanaflex) 4 mg tablet 1 tablet, oral, 2 times daily    topiramate (TOPAMAX) 100 mg, oral, 2 times daily       Past Medical History:   Diagnosis Date    Acute bronchitis 2023    Acute bronchospasm 2023    Chronic pain syndrome 02/15/2017    Chronic pain syndrome    Essential (primary) hypertension     Benign essential hypertension    Personal history of other diseases of the musculoskeletal system and connective tissue     History of osteoarthritis    Personal history of other medical treatment 2019    History of screening mammography    Personal history of other mental and behavioral disorders     History of anxiety disorder    Radiculopathy, lumbar region     Lumbar radiculopathy    Urinary frequency 2023    Vaginal discharge 2023    Vitamin D deficiency, unspecified     Vitamin D deficiency        Past Surgical History:   Procedure Laterality Date    CARPAL TUNNEL RELEASE  2013    Neuroplasty Decompression Median Nerve At Carpal Tunnel     SECTION, CLASSIC  12/10/2014     Section    OTHER SURGICAL HISTORY  12/10/2014    Laparoscopy With Vaginal Hysterectomy For Uterus > 250g        Family History   Problem Relation Name Age of Onset    Breast cancer Mother          No Known Allergies     Objective     There were no vitals filed for this visit.     Physical Exam  General: NAD, well groomed, well nourished  Eyes: Non-icteric sclera, EOMI  Ears, Nose, Mouth, and Throat: External ears and nose appear to be without deformity or rash. No lesions or masses noted. Hearing is grossly intact.   Neck: Trachea midline  Respiratory: Nonlabored breathing   Cardiovascular: no peripheral edema   Skin: No rashes or open lesions/ulcers identified on skin.    Back:   Palpation:  Tenderness to palpation over lumbar paraspinous muscles.     Left Hip: Pain over left greater trochanter. and Pain reproduced with internal/external rotation.  Tenderness to palpation in anterior hip.  Pain reproduced with flexion and extension.    Antalgic gait.    Neurologic:   Cranial nerves grossly intact.   Strength 5/5 and symmetric plantar/dorsiflexion   Sensation: Normal to light touch throughout, pinprick  intact throughout.  DTRs:normal and symmetric throughout  [place]: absent  Clonus: absent    Psychiatric: Alert, orientation to person, place, and time. Cooperative.    Imaging personally reviewed and independently interpreted:     Assessment/Plan   Libra Chery is a 64 y.o. female presenting with left low back and hip pain.  Patient presenting with left lumbar back pain and left hip pain.  Pain radiates to the anterior groin and down the anterior/lateral aspect of the left leg to the level of the knee.  Patient endorses numbness surrounding and paresthesias in the anterior lateral thigh.  Recent x-ray showing bilateral hip osteoarthritis worse on left than right and New/progressed Enthesopathic changes at the greater trochanters that could signify insertional gluteal tendinosis.  Referred to pain management by orthopedic doctor.  Discussed with patient proceeding with left greater trochanteric bursa injection in office today.  Risks and benefits of procedure discussed.  Patient agreeable at this time.  Will also schedule lumbar facet radiofrequency ablation and obtain lumbar spine x-rays.    Plan:  -Left greater trochanteric bursa steroid injection in office today  - Lumbar spine x-rays  - Refill tizanidine and Topamax  - Schedule lumbar facet radiofrequency ablation with fluoroscopy      We discussed  the risks, benefits and alternatives of the procedure including but not limited to: , Lack of efficacy , Transiently worsening pain , Bleeding, Infection , and Nerve Damage    Follow up: After  procedure    The patient was invited to contact us back anytime with any questions or concerns and follow-up with us in the office as needed.     There are no diagnoses linked to this encounter.    This note was generated with the aid of dictation software, there may be typos despite my attempts at proofreading.     Patient seen and plan of care discussed with Dr. Maurice Jade MD  PGY-1

## 2025-02-04 ENCOUNTER — APPOINTMENT (OUTPATIENT)
Dept: RADIOLOGY | Facility: CLINIC | Age: 65
End: 2025-02-04
Payer: COMMERCIAL

## 2025-02-11 ENCOUNTER — HOSPITAL ENCOUNTER (OUTPATIENT)
Dept: RADIOLOGY | Facility: CLINIC | Age: 65
Discharge: HOME | End: 2025-02-11
Payer: COMMERCIAL

## 2025-02-11 VITALS — BODY MASS INDEX: 36.09 KG/M2 | HEIGHT: 68 IN | WEIGHT: 238.1 LBS

## 2025-02-11 DIAGNOSIS — Z12.31 VISIT FOR SCREENING MAMMOGRAM: ICD-10-CM

## 2025-02-11 DIAGNOSIS — M47.816 LUMBAR FACET ARTHROPATHY: ICD-10-CM

## 2025-02-11 PROCEDURE — 72110 X-RAY EXAM L-2 SPINE 4/>VWS: CPT | Performed by: RADIOLOGY

## 2025-02-11 PROCEDURE — 72110 X-RAY EXAM L-2 SPINE 4/>VWS: CPT

## 2025-02-11 PROCEDURE — 77063 BREAST TOMOSYNTHESIS BI: CPT | Performed by: RADIOLOGY

## 2025-02-11 PROCEDURE — 77067 SCR MAMMO BI INCL CAD: CPT | Performed by: RADIOLOGY

## 2025-02-11 PROCEDURE — 77067 SCR MAMMO BI INCL CAD: CPT

## 2025-02-17 ENCOUNTER — APPOINTMENT (OUTPATIENT)
Dept: RHEUMATOLOGY | Facility: CLINIC | Age: 65
End: 2025-02-17
Payer: COMMERCIAL

## 2025-02-18 ENCOUNTER — OFFICE VISIT (OUTPATIENT)
Dept: RHEUMATOLOGY | Facility: CLINIC | Age: 65
End: 2025-02-18
Payer: COMMERCIAL

## 2025-02-18 VITALS — DIASTOLIC BLOOD PRESSURE: 86 MMHG | WEIGHT: 230 LBS | SYSTOLIC BLOOD PRESSURE: 136 MMHG | BODY MASS INDEX: 34.98 KG/M2

## 2025-02-18 DIAGNOSIS — M35.9 UNDIFFERENTIATED CONNECTIVE TISSUE DISEASE (MULTI): Primary | ICD-10-CM

## 2025-02-18 DIAGNOSIS — M79.7 FIBROMYALGIA: ICD-10-CM

## 2025-02-18 DIAGNOSIS — M19.90 ARTHRITIS: ICD-10-CM

## 2025-02-18 PROCEDURE — 3075F SYST BP GE 130 - 139MM HG: CPT | Performed by: INTERNAL MEDICINE

## 2025-02-18 PROCEDURE — 99215 OFFICE O/P EST HI 40 MIN: CPT | Performed by: INTERNAL MEDICINE

## 2025-02-18 PROCEDURE — 3079F DIAST BP 80-89 MM HG: CPT | Performed by: INTERNAL MEDICINE

## 2025-02-18 NOTE — PROGRESS NOTES
"Recheck  UCTD  /  FM   cont sciatica.  Multiple injection with no relief.   ER jt Jan 4 H for back / leg pain.  Ref to Ortho.  Current treatment with Dr. Richards.   Meloxicam with no relief, patient discontinued.    10 min late - she said she was walking slowly because her leg hurts since Dec.   She was unhappy that she was told she'd have to wait and be squeezed back in because she has 15 min after her appt to be seen.      Pressured speech, hard to redirect  HPI - she c/o chronic LBP, and she has L leg pain and says that it's swollen in her \"hip\" - troch area.  She sees pain mgmt and got troch injection, which helped some.  She said that ortho told her that they couldn't do anything for her and told her to see pain mgmt, and she is very unhappy about that.  She has a lot of anxiety and can't sleep.  She hasn't seen anybody about this and wants to talk to pain mgmt.  Chronic LBP and can't stand or sit for very long - injections didn't last long.  She also gets hand and foot pain.  She gets tingling in her LLE.  No AM stiffness - just achy.  No CP, resp, or GI.  No mouth sores, rash, or raynauds.  +dry mouth with tizanidine.  She didn't see eye dr last yr    PE  NAD  RRR no r/m/g  CTA  No edema  No synovitis    A/P - OA, FM, and (?)UCTD with DARLENE 1:40/+SSA  Check yearly labs  Seeing pain mgmt - I expl again to pt that I don't treat chronic pain, and she should address her pain issues with him  Emphasized importance of seeing eye dr  Follow up 1 yr  "

## 2025-02-21 ENCOUNTER — HOSPITAL ENCOUNTER (OUTPATIENT)
Dept: OPERATING ROOM | Facility: CLINIC | Age: 65
Setting detail: OUTPATIENT SURGERY
Discharge: HOME | End: 2025-02-21
Payer: COMMERCIAL

## 2025-02-21 VITALS
WEIGHT: 228.84 LBS | HEART RATE: 80 BPM | BODY MASS INDEX: 33.89 KG/M2 | DIASTOLIC BLOOD PRESSURE: 90 MMHG | RESPIRATION RATE: 16 BRPM | HEIGHT: 69 IN | SYSTOLIC BLOOD PRESSURE: 164 MMHG | OXYGEN SATURATION: 100 % | TEMPERATURE: 97.5 F

## 2025-02-21 DIAGNOSIS — M47.816 LUMBAR FACET ARTHROPATHY: ICD-10-CM

## 2025-02-21 DIAGNOSIS — T30.0 BURN: ICD-10-CM

## 2025-02-21 PROCEDURE — 2500000004 HC RX 250 GENERAL PHARMACY W/ HCPCS (ALT 636 FOR OP/ED): Performed by: PAIN MEDICINE

## 2025-02-21 PROCEDURE — 96372 THER/PROPH/DIAG INJ SC/IM: CPT | Performed by: PAIN MEDICINE

## 2025-02-21 PROCEDURE — 3600000006 HC OR TIME - EACH INCREMENTAL 1 MINUTE - PROCEDURE LEVEL ONE

## 2025-02-21 PROCEDURE — 3700000012 HC SEDATION LEVEL 5+ TIME - INITIAL 15 MINUTES 5/> YEARS

## 2025-02-21 PROCEDURE — 2720000007 HC OR 272 NO HCPCS

## 2025-02-21 PROCEDURE — 3700000013 HC SEDATION LEVEL 5+ TIME - EACH ADDITIONAL 15 MINUTES

## 2025-02-21 PROCEDURE — 7100000009 HC PHASE TWO TIME - INITIAL BASE CHARGE

## 2025-02-21 PROCEDURE — 3600000001 HC OR TIME - INITIAL BASE CHARGE - PROCEDURE LEVEL ONE

## 2025-02-21 PROCEDURE — 7100000010 HC PHASE TWO TIME - EACH INCREMENTAL 1 MINUTE

## 2025-02-21 RX ORDER — SODIUM CHLORIDE 0.9 % (FLUSH) 0.9 %
SYRINGE (ML) INJECTION AS NEEDED
Status: COMPLETED | OUTPATIENT
Start: 2025-02-21 | End: 2025-02-21

## 2025-02-21 RX ORDER — LIDOCAINE 40 MG/G
CREAM TOPICAL 4 TIMES DAILY PRN
Qty: 60 G | Refills: 0 | Status: SHIPPED | OUTPATIENT
Start: 2025-02-21 | End: 2025-03-23

## 2025-02-21 RX ORDER — ROPIVACAINE HYDROCHLORIDE 5 MG/ML
INJECTION, SOLUTION EPIDURAL; INFILTRATION; PERINEURAL AS NEEDED
Status: COMPLETED | OUTPATIENT
Start: 2025-02-21 | End: 2025-02-21

## 2025-02-21 RX ORDER — INDOMETHACIN 25 MG/1
CAPSULE ORAL AS NEEDED
Status: COMPLETED | OUTPATIENT
Start: 2025-02-21 | End: 2025-02-21

## 2025-02-21 RX ORDER — TRIAMCINOLONE ACETONIDE 40 MG/ML
INJECTION, SUSPENSION INTRA-ARTICULAR; INTRAMUSCULAR AS NEEDED
Status: COMPLETED | OUTPATIENT
Start: 2025-02-21 | End: 2025-02-21

## 2025-02-21 RX ORDER — BACITRACIN ZINC 500 UNIT/G
OINTMENT (GRAM) TOPICAL 2 TIMES DAILY
Qty: 14 G | Refills: 0 | Status: SHIPPED | OUTPATIENT
Start: 2025-02-21

## 2025-02-21 RX ORDER — MIDAZOLAM HYDROCHLORIDE 1 MG/ML
INJECTION, SOLUTION INTRAMUSCULAR; INTRAVENOUS AS NEEDED
Status: COMPLETED | OUTPATIENT
Start: 2025-02-21 | End: 2025-02-21

## 2025-02-21 RX ORDER — LIDOCAINE HYDROCHLORIDE 5 MG/ML
INJECTION, SOLUTION INFILTRATION; INTRAVENOUS AS NEEDED
Status: COMPLETED | OUTPATIENT
Start: 2025-02-21 | End: 2025-02-21

## 2025-02-21 RX ADMIN — TRIAMCINOLONE ACETONIDE 40 MG: 40 INJECTION, SUSPENSION INTRA-ARTICULAR; INTRAMUSCULAR at 09:27

## 2025-02-21 RX ADMIN — SODIUM BICARBONATE 5 MEQ: 84 INJECTION, SOLUTION INTRAVENOUS at 08:33

## 2025-02-21 RX ADMIN — ROPIVACAINE HYDROCHLORIDE 1 ML: 5 INJECTION, SOLUTION EPIDURAL; INFILTRATION; PERINEURAL at 09:26

## 2025-02-21 RX ADMIN — Medication 10 ML: at 08:23

## 2025-02-21 RX ADMIN — MIDAZOLAM 2 MG: 1 INJECTION INTRAMUSCULAR; INTRAVENOUS at 08:23

## 2025-02-21 RX ADMIN — ROPIVACAINE HYDROCHLORIDE 6 ML: 5 INJECTION, SOLUTION EPIDURAL; INFILTRATION; PERINEURAL at 09:07

## 2025-02-21 RX ADMIN — LIDOCAINE HYDROCHLORIDE 5 ML: 5 INJECTION, SOLUTION INFILTRATION at 08:31

## 2025-02-21 ASSESSMENT — COLUMBIA-SUICIDE SEVERITY RATING SCALE - C-SSRS
2. HAVE YOU ACTUALLY HAD ANY THOUGHTS OF KILLING YOURSELF?: NO
1. IN THE PAST MONTH, HAVE YOU WISHED YOU WERE DEAD OR WISHED YOU COULD GO TO SLEEP AND NOT WAKE UP?: NO
6. HAVE YOU EVER DONE ANYTHING, STARTED TO DO ANYTHING, OR PREPARED TO DO ANYTHING TO END YOUR LIFE?: NO

## 2025-02-21 ASSESSMENT — PAIN SCALES - GENERAL
PAINLEVEL_OUTOF10: 0 - NO PAIN
PAINLEVEL_OUTOF10: 5 - MODERATE PAIN
PAINLEVEL_OUTOF10: 0 - NO PAIN
PAINLEVEL_OUTOF10: 0 - NO PAIN

## 2025-02-21 ASSESSMENT — PAIN - FUNCTIONAL ASSESSMENT
PAIN_FUNCTIONAL_ASSESSMENT: 0-10
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
## 2025-02-21 NOTE — H&P
Pain Management H&P    History Of Present Illness  Libra Chery is a 64 y.o. female presents for procedure state below. Endorses no changes in past medical history or medical health since last seen in clinic.     ASA: 2     Past Medical History  She has a past medical history of Acute bronchitis (2023), Acute bronchospasm (2023), Chronic pain syndrome (02/15/2017), Essential (primary) hypertension, Personal history of other diseases of the musculoskeletal system and connective tissue, Personal history of other medical treatment (2019), Personal history of other mental and behavioral disorders, Radiculopathy, lumbar region, Urinary frequency (2023), Vaginal discharge (2023), and Vitamin D deficiency, unspecified.    She has no past medical history of Personal history of irradiation.    Surgical History  She has a past surgical history that includes Carpal tunnel release (2013);  section, classic (12/10/2014); and Other surgical history (12/10/2014).     Social History  She reports that she has never smoked. She has never used smokeless tobacco. She reports current drug use. Drug: Marijuana. No history on file for alcohol use.    Family History  Family History   Problem Relation Name Age of Onset    Breast cancer Mother loreto jesus  63        Allergies  Patient has no known allergies.    Review of Symptoms:   Constitutional: Negative for chills, diaphoresis or fever  HENT: Negative for neck swelling  Eyes:.  Negative for eye pain  Respiratory:.  Negative for cough, shortness of breath or wheezing    Cardiovascular:.  Negative for chest pain or palpitations  Gastrointestinal:.  Negative for abdominal pain, nausea and vomiting  Genitourinary:.  Negative for urgency  Musculoskeletal:  Positive for back pain. Positive for joint pain. Denies falls within the past 3 months.  Skin: Negative for wounds or itching   Neurological: Negative for dizziness, seizures, loss  of consciousness and weakness  Endo/Heme/Allergies: Does not bruise/bleed easily  Psychiatric/Behavioral: Negative for depression. The patient does not appear anxious.       PHYSICAL EXAM  Vitals signs reviewed  Constitutional:       General: Not in acute distress     Appearance: Normal appearance. Not ill-appearing.  HENT:     Head: Normocephalic and atraumatic  Eyes:     Conjunctiva/sclera: Conjunctivae normal  Cardiovascular:     Rate and Rhythm: Normal rate and regular rhythm  Pulmonary:     Effort: No respiratory distress  Abdominal:     Palpations: Abdomen is soft  Musculoskeletal: BILL  Skin:     General: Skin is warm and dry  Neurological:     General: No focal deficit present  Psychiatric:         Mood and Affect: Mood normal         Behavior: Behavior normal     Last Recorded Vitals  There were no vitals taken for this visit.    Relevant Results  Current Outpatient Medications   Medication Instructions    acetaminophen (TYLENOL) 650 mg, oral, Every 6 hours PRN    amoxicillin (Amoxil) 500 mg capsule Take 4 Tablets 1 Hour Prior to Dental Procedure or Cleaning.    cholecalciferol (VITAMIN D3) 50 mcg, Daily    cyclobenzaprine (FLEXERIL) 10 mg, oral, 3 times daily PRN    dicyclomine (BENTYL) 20 mg, oral, 4 times daily PRN    HYDROcodone-acetaminophen (Norco) 5-325 mg tablet 1 tablet, oral, Every 6 hours PRN    hydroxychloroquine (Plaquenil) 200 mg tablet oral, 2 times daily    ibuprofen 600 mg, oral, 4 times daily PRN    lisinopriL-hydrochlorothiazide 20-25 mg tablet 1 tablet, oral, Daily    meloxicam (MOBIC) 15 mg, oral, Daily    methocarbamol (ROBAXIN) 1,000 mg, oral, Nightly    orphenadrine (NORFLEX) 100 mg, oral, 2 times daily PRN, Do not crush, chew, or split.    tiZANidine (ZANAFLEX) 4 mg, oral, 2 times daily    topiramate (TOPAMAX) 100 mg, oral, 2 times daily       No results found for this or any previous visit from the past 1000 days.     No image results found.       No diagnosis found.      ASSESSMENT/PLAN  Libra Chery is a 64 y.o. female presenting for bilateral L3, L4, L5 lumbar medial branch radiofrequency ablation     Patient denies any recent antibiotic use or infections, denies any blood thinner use, and denies contrast or local anesthetic allergies     Risks, benefits, alternatives discussed. All questions answered to the best of my ability. Patient agrees to proceed.      Our plan is as follows:  - Proceed with aforementioned procedure          DO GOLDEN Gonzalez Pain fellow
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
## 2025-02-21 NOTE — DISCHARGE INSTRUCTIONS
DISCHARGE INSTRUCTIONS FOR INJECTIONS       After most injections, it is recommended that you relax and limit your activity for the remainder of the day unless you have been told otherwise by your pain physician.  You should not drive a car, operate machinery, or make important legal decisions unless otherwise directed by your pain physician.  You may resume your normal activity, including exercise, tomorrow.      Keep a written pain diary of how much pain relief you experienced following the injection procedure and the length of time of pain relief you experienced pain relief. Following diagnostic injections like medial branch nerve blocks, sacroiliac joint blocks, stellate ganglion injections and other blocks, it is very important you record the specific amount of pain relief you experienced immediately after the injectionand how long it lasted. Your doctor will ask you for this information at your follow up visit.     For all injections, please keep the injection site dry and inspect the site for a couple of days. You may remove the Band-Aid the day of the injection at any time.     Some discomfort, bruising or slight swelling may occur at the injection site. This is not abnormal if it occurs.  If needed you may:    -Take over the counter medication such as Tylenol or Motrin.   -Apply an ice pack for 30 minutes, 2 to 3 times a day for the first 24 hours.     You may shower today; no soaking baths, hot tubs, whirlpools or swimming pools for two days.      If you are given steroids in your injection, it may take 3-5 days for the steroid medication to take effect. You may notice a worsening of your symptoms for 1-2 days after the injection. This is not abnormal.  You may use acetaminophen, ibuprofen, or prescription medication that your doctor may have prescribed for you if you need to do so.     A few common side effects of steroids include facial flushing, sweating, restlessness, irritability,difficulty sleeping,  increase in blood sugar, and increased blood pressure. If you have diabetes, please monitor your blood sugar at least once a day for at least 5 days. If you have poorly controlled high blood pressure, monitoryour blood pressure for at least 2 days and contact your primary care physician if these numbers are unusually high for you.      If you take aspirin or non-steroidal anti-inflammatory drugs (examples are Motrin, Advil, ibuprofen, Naprosyn, Voltaren, Relafen, etc.) you may restart these this evening, but stop taking it 3 days before your next appointment, unless instructed otherwiseby your physician.      You do not need to discontinue non-aspirin-containing pain medications prior to an injection (examples: Celebrex, tramadol, hydrocodone and acetaminophen).      If you take a blood thinning medication (Coumadin, Lovenox, Fragmin,Ticlid, Plavix, Pradaxa, etc.), please discuss this with your primary care physician/cardiologist and your pain physician. These medications MUST be discontinued before you can have an injection safely, without the risk of uncontrolled bleeding. If these medications are not discontinued for an appropriate period of time, you will not be able to receivean injection. Please adhere to instructions given to you about when to restart your blood thinning medication. If you have any questions please reach out to our team.    If you are taking Coumadin, please have your INR checked the morning of your procedure and bring the result to your appointment unless otherwise instructed. If your INR is over 1.2, your injection may need to be rescheduled to avoid uncontrolled bleeding from the needle placement.     Call [facility]  and ask for Pain Management at [phone] between 8am-4pm Monday - Friday if you are experiencing the following:    If you received an epidural or spinal injection:    -Headache that doesnot go away with medicine, is worse when sitting or standing up, and is greatly  relieved upon lying down.   -Severe pain worse than or different than your baseline pain.   -Chills or fever (101º F or greater).   -Drainage or signs of infection at the injection site     Go directly to the Emergency Department if you are experiencing the following and received an epidural or spinal injection:   -Abrupt weakness or progressive weakness in your legs that starts after you leave the clinic.   -Abrupt severe or worsening numbness in your legs.   -Inability to urinate after the injection or loss of bowel or bladder control without the urge to defecate or urinate.     If you have a clinical question that cannot wait until your next appointment, please call [phone] between 8am-4pm Monday - Friday or send a [employer] message. We do our best to return all non-emergency messages within 24 hours, Monday - Friday. A nurse or physician will return your message. You may also try calling and they will do their best to answer your question(s):    - Dr. Finesse Garcia/Dr. Richards's nurse ([phone])      If you need to cancel an appointment, please call the scheduling staff at [phone] during normal business hours or leave a message at least 24 hours in advance.     If you are going to be sedated for your next procedure, you MUST have responsible adult who can legally drive accompany you home. You cannot eat or drink for at least eight hours prior to the planned procedure if you are going to receive sedation. You may take your non-blood thinning medications with a small sip of water.

## 2025-02-21 NOTE — BRIEF OP NOTE
Date: 2025  OR Location: [facility] [place] [facility] NON-OR PROCEDURES    Name: Libra Chery : 1960, Age: 64 y.o., MRN: [id number], Sex: female    Diagnosis  Lumbar spondylosis   * No Diagnosis Codes entered *     Procedures  Left sided lumbar facet RF  two level    Surgeons   Yancy    Resident/Fellow/Other Assistant:  Lloyd    Staff:   Circulator: Chula    Anesthesia Staff: No anesthesia staff entered.    Procedure Summary  Anesthesia: Anesthesia type not filed in the log.  ASA: 3  Estimated Blood Loss: 0mL  Intra-op Medications: * Intraprocedure medication information is unavailable because the case start and end events have not been set *      Intraprocedure I/O Totals       None           Specimen: No specimens collected               Findings:   Libra Chery is a 64 y.o. female  with  lumbar spondylosis here for lumbar medial branch radiofrequency ablations.      Procedure performed: *left lumbar medial branch radiofrequency ablations at L3, 4, 5 using fluoroscopic guidance    Indication: lumbar spondylosis with greater than 80% pain relief on diagnostic medial branch blocks x2    Performed by: Dr. Richards  Assistant: MD lavon    The patient was identified in the preoperative holding area and informed consent was obtained.  The surgical area was marked according to protocol.  The patient was transported to the operating room on the [facility] and a timeout was conducted. ASA Standard monitors were applied.     The patient was transferred to the operating room table and placed in prone position.  X-ray guidance was used to ascertain the appropriate levels.  The site was prepped and draped in the usual fashion using ChloraPrep and sterile towels.  Skin anesthesia was achieved with 0.5% lidocaine with bicarbonate using a 27-gauge hypodermic needle.  3.5 inch sidekick insulated radiofrequency needles with 10 mm active tips were advanced to the target areas using fluoroscopy and biplanar images.  Sensory and motor  testing was conducted at all 3 levels with difficult to get the proper response / level of stimulation . After multiple trial with perfect fluoroscopy location, decision was made to proceed with the treatment . We did two level L3/4 and 4/5 not L5/S1.  1.5 mL of ropivacaine were injected at each level and a 3-minute timer was started to make sure proper analgesia.  We then connected the radiofrequency neurotomy at 80 °C for 1 minute 30 seconds without issue.  Subsequently, we injected 10 mg of depomedrol at each level.  The needles were removed.  The procedure was abandoned on the rt side this time . The machine well be checked and will schedule the there side later      The procedure was done without any evidence of complications and the patient was transported to the recovery area.  There were no apparent complications.  The patient was discharged in stable condition.    Anesthesia: Local and  2mg midazolam conscious sedation      Complications:  we did only one side  Question small burn on the left thigh area possible area of ground   Disposition: PACU - hemodynamically stable.  Condition: stable  Specimens Collected: No specimens collected  Attending Attestation: I was present and scrubbed for the entire procedure.    MD Maurice

## 2025-02-24 ENCOUNTER — TELEPHONE (OUTPATIENT)
Dept: PAIN MEDICINE | Facility: CLINIC | Age: 65
End: 2025-02-24
Payer: COMMERCIAL
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
## 2025-02-25 ENCOUNTER — TELEPHONE (OUTPATIENT)
Facility: CLINIC | Age: 65
End: 2025-02-25
Payer: COMMERCIAL
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
## 2025-04-07 DIAGNOSIS — M19.90 ARTHRITIS: ICD-10-CM

## 2025-04-07 RX ORDER — MELOXICAM 15 MG/1
15 TABLET ORAL DAILY
Qty: 30 TABLET | Refills: 2 | Status: SHIPPED | OUTPATIENT
Start: 2025-04-07 | End: 2026-04-07

## 2025-04-07 NOTE — TELEPHONE ENCOUNTER
Pt called for medication refill meloxicam 15 mg to be filled to preferred pharmacy. She was given by another physician but Dr. Rcihards told patient that we will refill it.

## 2025-05-01 ENCOUNTER — APPOINTMENT (OUTPATIENT)
Dept: PAIN MEDICINE | Facility: CLINIC | Age: 65
End: 2025-05-01
Payer: COMMERCIAL

## 2025-05-01 ENCOUNTER — OFFICE VISIT (OUTPATIENT)
Dept: PAIN MEDICINE | Facility: CLINIC | Age: 65
End: 2025-05-01
Payer: COMMERCIAL

## 2025-05-01 VITALS
SYSTOLIC BLOOD PRESSURE: 156 MMHG | HEART RATE: 121 BPM | DIASTOLIC BLOOD PRESSURE: 111 MMHG | WEIGHT: 230 LBS | HEIGHT: 69 IN | RESPIRATION RATE: 17 BRPM | BODY MASS INDEX: 34.07 KG/M2

## 2025-05-01 DIAGNOSIS — M54.12 CERVICAL RADICULITIS: ICD-10-CM

## 2025-05-01 DIAGNOSIS — M19.90 ARTHRITIS: ICD-10-CM

## 2025-05-01 DIAGNOSIS — M54.50 LUMBAR PAIN: ICD-10-CM

## 2025-05-01 PROCEDURE — 1036F TOBACCO NON-USER: CPT | Performed by: PAIN MEDICINE

## 2025-05-01 PROCEDURE — 3077F SYST BP >= 140 MM HG: CPT | Performed by: PAIN MEDICINE

## 2025-05-01 PROCEDURE — 3080F DIAST BP >= 90 MM HG: CPT | Performed by: PAIN MEDICINE

## 2025-05-01 PROCEDURE — 3008F BODY MASS INDEX DOCD: CPT | Performed by: PAIN MEDICINE

## 2025-05-01 PROCEDURE — 99213 OFFICE O/P EST LOW 20 MIN: CPT | Performed by: PAIN MEDICINE

## 2025-05-01 RX ORDER — MELOXICAM 15 MG/1
15 TABLET ORAL DAILY
Qty: 90 TABLET | Refills: 0 | Status: SHIPPED | OUTPATIENT
Start: 2025-05-01 | End: 2025-07-30

## 2025-05-01 RX ORDER — TOPIRAMATE 100 MG/1
100 TABLET, FILM COATED ORAL 2 TIMES DAILY
Qty: 180 TABLET | Refills: 0 | Status: SHIPPED | OUTPATIENT
Start: 2025-05-01 | End: 2025-07-30

## 2025-05-01 RX ORDER — TIZANIDINE 4 MG/1
4 TABLET ORAL 2 TIMES DAILY
Qty: 180 TABLET | Refills: 0 | Status: SHIPPED | OUTPATIENT
Start: 2025-05-01 | End: 2025-07-30

## 2025-05-01 ASSESSMENT — PAIN DESCRIPTION - DESCRIPTORS: DESCRIPTORS: ACHING;DULL

## 2025-05-01 ASSESSMENT — PAIN SCALES - GENERAL
PAINLEVEL_OUTOF10: 6
PAINLEVEL_OUTOF10: 6

## 2025-05-01 ASSESSMENT — PAIN - FUNCTIONAL ASSESSMENT: PAIN_FUNCTIONAL_ASSESSMENT: 0-10
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
## 2025-05-16 ENCOUNTER — APPOINTMENT (OUTPATIENT)
Dept: PRIMARY CARE | Facility: CLINIC | Age: 65
End: 2025-05-16
Payer: COMMERCIAL

## 2025-05-16 VITALS
WEIGHT: 219.5 LBS | HEART RATE: 85 BPM | BODY MASS INDEX: 32.89 KG/M2 | DIASTOLIC BLOOD PRESSURE: 89 MMHG | TEMPERATURE: 97.8 F | OXYGEN SATURATION: 96 % | SYSTOLIC BLOOD PRESSURE: 152 MMHG

## 2025-05-16 DIAGNOSIS — E78.5 HYPERLIPIDEMIA, UNSPECIFIED HYPERLIPIDEMIA TYPE: ICD-10-CM

## 2025-05-16 DIAGNOSIS — Z12.11 SCREENING FOR COLORECTAL CANCER: ICD-10-CM

## 2025-05-16 DIAGNOSIS — E55.9 VITAMIN D DEFICIENCY: ICD-10-CM

## 2025-05-16 DIAGNOSIS — N18.31 STAGE 3A CHRONIC KIDNEY DISEASE (MULTI): ICD-10-CM

## 2025-05-16 DIAGNOSIS — Z11.59 SCREENING FOR VIRAL DISEASE: ICD-10-CM

## 2025-05-16 DIAGNOSIS — Z00.00 ROUTINE GENERAL MEDICAL EXAMINATION AT A HEALTH CARE FACILITY: ICD-10-CM

## 2025-05-16 DIAGNOSIS — I10 ESSENTIAL HYPERTENSION: Primary | ICD-10-CM

## 2025-05-16 DIAGNOSIS — Z12.12 SCREENING FOR COLORECTAL CANCER: ICD-10-CM

## 2025-05-16 RX ORDER — LISINOPRIL AND HYDROCHLOROTHIAZIDE 20; 25 MG/1; MG/1
1 TABLET ORAL DAILY
Qty: 90 TABLET | Refills: 1 | Status: SHIPPED | OUTPATIENT
Start: 2025-05-16

## 2025-05-16 ASSESSMENT — COLUMBIA-SUICIDE SEVERITY RATING SCALE - C-SSRS
1. IN THE PAST MONTH, HAVE YOU WISHED YOU WERE DEAD OR WISHED YOU COULD GO TO SLEEP AND NOT WAKE UP?: NO
2. HAVE YOU ACTUALLY HAD ANY THOUGHTS OF KILLING YOURSELF?: NO
6. HAVE YOU EVER DONE ANYTHING, STARTED TO DO ANYTHING, OR PREPARED TO DO ANYTHING TO END YOUR LIFE?: NO

## 2025-05-16 NOTE — ASSESSMENT & PLAN NOTE
- Blood pressure slightly elevated in office, patient has not taken her blood pressure medicine yet she takes it at at bedtime  -Encouraged to check blood pressure 1-2 times weekly at home  -Low-salt, DASH diet

## 2025-05-16 NOTE — PROGRESS NOTES
Subjective   Patient ID: Libra Chery is a 64 y.o. female who presents for Establish Care.    HPI  Pt here for physical exam and to establish care.  She is currently following with pain management for lumbar spondylosis and right hip pain.  She is doing well and has no complaints at this time.        Review of Systems  All pertinent positive symptoms are included in the history of present illness.  All other systems have been reviewed and are negative and noncontributory to this patient's current ailments.    Current Outpatient Medications   Medication Instructions    cholecalciferol (VITAMIN D3) 50 mcg, Daily    dicyclomine (BENTYL) 20 mg, oral, 4 times daily PRN    hydroxychloroquine (Plaquenil) 200 mg tablet oral, 2 times daily    ibuprofen 600 mg, oral, 4 times daily PRN    lisinopriL-hydrochlorothiazide 20-25 mg tablet 1 tablet, oral, Daily    meloxicam (MOBIC) 15 mg, oral, Daily    methocarbamol (ROBAXIN) 1,000 mg, oral, Nightly    orphenadrine (NORFLEX) 100 mg, oral, 2 times daily PRN, Do not crush, chew, or split.    topiramate (TOPAMAX) 100 mg, oral, 2 times daily     Objective   Visit Vitals  /89   Pulse 85   Temp 36.6 °C (97.8 °F)   Wt 99.6 kg (219 lb 8 oz)   SpO2 96%   BMI 32.89 kg/m²   OB Status Hysterectomy   Smoking Status Never   BSA 2.19 m²       Physical Exam  CONSTITUTIONAL - well nourished, well developed, looks like stated age, in no acute distress.  SKIN - normal skin color and pigmentation, normal skin turgor without rash, lesions, or nodules visualized  HEAD - no trauma, normocephalic  EYES - pupils are equal and reactive to light, and extraocular muscles are intact  ENT - TM's intact, no signs of infection, uvula midline, and throat normal, no exudate, nasal passage without discharge.  NECK - supple without rigidity, no neck mass was observed, no thyromegaly or thyroid nodules  CHEST - clear to auscultation, no wheezing, no crackles and no rales, good effort  CARDIAC - regular rate  and regular rhythm, no skipped beats, no murmur  ABDOMEN - no organomegaly, soft, nontender, nondistended, normal bowel sounds, no guarding/rebound/rigidity.  EXTREMITIES - no obvious or evident edema, no obvious or evident deformities  NEUROLOGICAL - normal gait, normal balance, no ataxia, alert, oriented  PSYCHIATRIC - alert, pleasant and age-appropriate  IMMUNOLOGIC - no cervical lymphadenopathy     Assessment/Plan   Assessment & Plan  Essential hypertension  - Blood pressure slightly elevated in office, patient has not taken her blood pressure medicine yet she takes it at at bedtime  -Encouraged to check blood pressure 1-2 times weekly at home  -Low-salt, DASH diet  Hyperlipidemia, unspecified hyperlipidemia type  - Check fasting lipids today  Vitamin D deficiency  - Check vitamin D level today  Stage 3a chronic kidney disease (Multi)  - Check CMP today  - Maintain adequate hydration and blood pressure control    Orders Placed This Encounter   Procedures    Tdap vaccine, age 7 years and older  (BOOSTRIX)    HIV 1/2 Antigen/Antibody Screen with Reflex to Confirmation    Comprehensive Metabolic Panel    Lipid Panel    Hemoglobin A1C    Vitamin D 25-Hydroxy,Total (for eval of Vitamin D levels)    TSH with reflex to Free T4 if abnormal    CBC and Auto Differential         1. HM  -CBC, CMP, Lipid panel, Vit D, TSH with reflex T4  -Vaccines:       Flu: Defer      Shingrix:  11/28/2023      Pneumococcal: declined      Tdap: 05/16/2025  -Elvira w/ marzena: 09/09/2024  -Last PAP: follows with gyn  -DEXA: at 65  -Colonoscopy: 06/16/2015-ordered    Last MCR / CPE: 05/16/2025     Return to office in 6 months or sooner if needed.  If you should experience concerning symptoms, go to the nearest Emergency Department.      JAYCOB August-CNP  Patient was identified as a fall risk. Risk prevention instructions provided.

## 2025-05-16 NOTE — LETTER
To whom it may concern,    Libra Chery is unable to serve jury duty due to multiple medical conditions                  Thank you,           Patricia DEVRIES-CNP

## 2025-05-17 LAB
25(OH)D3+25(OH)D2 SERPL-MCNC: 17 NG/ML (ref 30–100)
ALBUMIN SERPL-MCNC: 4.5 G/DL (ref 3.6–5.1)
ALP SERPL-CCNC: 71 U/L (ref 37–153)
ALT SERPL-CCNC: 18 U/L (ref 6–29)
ANION GAP SERPL CALCULATED.4IONS-SCNC: 9 MMOL/L (CALC) (ref 7–17)
AST SERPL-CCNC: 20 U/L (ref 10–35)
BASOPHILS # BLD AUTO: 31 CELLS/UL (ref 0–200)
BASOPHILS NFR BLD AUTO: 0.5 %
BILIRUB SERPL-MCNC: 0.6 MG/DL (ref 0.2–1.2)
BUN SERPL-MCNC: 16 MG/DL (ref 7–25)
CALCIUM SERPL-MCNC: 9 MG/DL (ref 8.6–10.4)
CHLORIDE SERPL-SCNC: 109 MMOL/L (ref 98–110)
CHOLEST SERPL-MCNC: 185 MG/DL
CHOLEST/HDLC SERPL: 1.9 (CALC)
CO2 SERPL-SCNC: 24 MMOL/L (ref 20–32)
CREAT SERPL-MCNC: 1.11 MG/DL (ref 0.5–1.05)
EGFRCR SERPLBLD CKD-EPI 2021: 56 ML/MIN/1.73M2
EOSINOPHIL # BLD AUTO: 118 CELLS/UL (ref 15–500)
EOSINOPHIL NFR BLD AUTO: 1.9 %
ERYTHROCYTE [DISTWIDTH] IN BLOOD BY AUTOMATED COUNT: 15.6 % (ref 11–15)
EST. AVERAGE GLUCOSE BLD GHB EST-MCNC: 114 MG/DL
EST. AVERAGE GLUCOSE BLD GHB EST-SCNC: 6.3 MMOL/L
GLUCOSE SERPL-MCNC: 90 MG/DL (ref 65–99)
HBA1C MFR BLD: 5.6 %
HCT VFR BLD AUTO: 40.2 % (ref 35–45)
HDLC SERPL-MCNC: 97 MG/DL
HGB BLD-MCNC: 12.3 G/DL (ref 11.7–15.5)
HIV 1+2 AB+HIV1 P24 AG SERPL QL IA: NORMAL
LDLC SERPL CALC-MCNC: 74 MG/DL (CALC)
LYMPHOCYTES # BLD AUTO: 806 CELLS/UL (ref 850–3900)
LYMPHOCYTES NFR BLD AUTO: 13 %
MCH RBC QN AUTO: 27.3 PG (ref 27–33)
MCHC RBC AUTO-ENTMCNC: 30.6 G/DL (ref 32–36)
MCV RBC AUTO: 89.1 FL (ref 80–100)
MONOCYTES # BLD AUTO: 446 CELLS/UL (ref 200–950)
MONOCYTES NFR BLD AUTO: 7.2 %
NEUTROPHILS # BLD AUTO: 4799 CELLS/UL (ref 1500–7800)
NEUTROPHILS NFR BLD AUTO: 77.4 %
NONHDLC SERPL-MCNC: 88 MG/DL (CALC)
PLATELET # BLD AUTO: 253 THOUSAND/UL (ref 140–400)
PMV BLD REES-ECKER: 10.3 FL (ref 7.5–12.5)
POTASSIUM SERPL-SCNC: 3.8 MMOL/L (ref 3.5–5.3)
PROT SERPL-MCNC: 7.4 G/DL (ref 6.1–8.1)
RBC # BLD AUTO: 4.51 MILLION/UL (ref 3.8–5.1)
SODIUM SERPL-SCNC: 142 MMOL/L (ref 135–146)
TRIGL SERPL-MCNC: 68 MG/DL
TSH SERPL-ACNC: 1.91 MIU/L (ref 0.4–4.5)
WBC # BLD AUTO: 6.2 THOUSAND/UL (ref 3.8–10.8)

## 2025-05-19 DIAGNOSIS — E55.9 VITAMIN D DEFICIENCY: Primary | ICD-10-CM

## 2025-05-19 RX ORDER — ERGOCALCIFEROL 1.25 MG/1
1.25 CAPSULE ORAL WEEKLY
Qty: 12 CAPSULE | Refills: 1 | Status: SHIPPED | OUTPATIENT
Start: 2025-05-19 | End: 2025-08-11

## 2025-06-10 ENCOUNTER — APPOINTMENT (OUTPATIENT)
Dept: OBSTETRICS AND GYNECOLOGY | Facility: CLINIC | Age: 65
End: 2025-06-10
Payer: COMMERCIAL

## 2025-06-10 VITALS
SYSTOLIC BLOOD PRESSURE: 130 MMHG | HEIGHT: 69 IN | DIASTOLIC BLOOD PRESSURE: 70 MMHG | WEIGHT: 219 LBS | BODY MASS INDEX: 32.44 KG/M2

## 2025-06-10 DIAGNOSIS — Z01.419 ENCOUNTER FOR ANNUAL ROUTINE GYNECOLOGICAL EXAMINATION: Primary | ICD-10-CM

## 2025-06-10 DIAGNOSIS — Z12.31 VISIT FOR SCREENING MAMMOGRAM: ICD-10-CM

## 2025-06-10 PROCEDURE — 3078F DIAST BP <80 MM HG: CPT | Performed by: OBSTETRICS & GYNECOLOGY

## 2025-06-10 PROCEDURE — 3008F BODY MASS INDEX DOCD: CPT | Performed by: OBSTETRICS & GYNECOLOGY

## 2025-06-10 PROCEDURE — 99396 PREV VISIT EST AGE 40-64: CPT | Performed by: OBSTETRICS & GYNECOLOGY

## 2025-06-10 PROCEDURE — 3075F SYST BP GE 130 - 139MM HG: CPT | Performed by: OBSTETRICS & GYNECOLOGY

## 2025-06-10 SDOH — ECONOMIC STABILITY: FOOD INSECURITY: WITHIN THE PAST 12 MONTHS, YOU WORRIED THAT YOUR FOOD WOULD RUN OUT BEFORE YOU GOT MONEY TO BUY MORE.: NEVER TRUE

## 2025-06-10 SDOH — ECONOMIC STABILITY: FOOD INSECURITY: WITHIN THE PAST 12 MONTHS, THE FOOD YOU BOUGHT JUST DIDN'T LAST AND YOU DIDN'T HAVE MONEY TO GET MORE.: NEVER TRUE

## 2025-06-10 SDOH — ECONOMIC STABILITY: TRANSPORTATION INSECURITY
IN THE PAST 12 MONTHS, HAS THE LACK OF TRANSPORTATION KEPT YOU FROM MEDICAL APPOINTMENTS OR FROM GETTING MEDICATIONS?: NO

## 2025-06-10 SDOH — ECONOMIC STABILITY: TRANSPORTATION INSECURITY
IN THE PAST 12 MONTHS, HAS LACK OF TRANSPORTATION KEPT YOU FROM MEETINGS, WORK, OR FROM GETTING THINGS NEEDED FOR DAILY LIVING?: NO

## 2025-06-10 ASSESSMENT — PATIENT HEALTH QUESTIONNAIRE - PHQ9
2. FEELING DOWN, DEPRESSED OR HOPELESS: NOT AT ALL
1. LITTLE INTEREST OR PLEASURE IN DOING THINGS: NOT AT ALL
SUM OF ALL RESPONSES TO PHQ9 QUESTIONS 1 & 2: 0

## 2025-06-10 ASSESSMENT — PAIN SCALES - GENERAL: PAINLEVEL_OUTOF10: 0-NO PAIN

## 2025-06-10 NOTE — PATIENT INSTRUCTIONS
Thanks for coming in today for your annual GYN exam.    Additional Pap smears are not needed.  However, please return to the office every 1 to 2 years for your annual GYN exam.    Arrange to have a mammogram performed once a year.    Follow-up with your PCP and other healthcare specialist as needed.    Arrange to have a DEXA/bone density scan performed every 2 to 3 years.    Feel free to call the office with any problems, questions or concerns prior to your next scheduled visit.

## 2025-06-11 NOTE — PROGRESS NOTES
Assessment and Plan:  Libra Chery is a 65 y/o obese  postmenopausal woman presenting today for annual GYN exam.     Diagnoses:  #1 Routine annual gynecologic exam  #2 Breast cancer screening    Assessment/Plan:  1. Annual GYN exam  - Additional pap smears not needed.  - Mammogram requisition provided.  - Return to the office in one year or PRN.  - Start DEXA scans at age 65.  - Follow up with PCP.    Follow up with Dr. Silver.    [place] Attestation  By signing my name below, I, Tracy Barksdale, attest that this documentation has been prepared under the direction and in the presence of Alaina Silver MD on 6/10/2025 at 8:39 PM.     HPI:   Libra Chery is a 65 y/o obese  postmenopausal woman presenting today for annual GYN exam. She does not report any gynecologic issues today. She denies abnormal discharge, pelvic pain, or vaginal bleeding.    She reports experiencing severe sciatic nerve pain starting in December. A steroid injection administered in February provided significant relief.    ROS:  Review of Systems   Genitourinary:  Negative for pelvic pain, vaginal bleeding and vaginal discharge.     Physical Exam:   Physical Exam  Constitutional:       General: She is not in acute distress.     Appearance: Normal appearance. She is obese.   Genitourinary:      Vulva exam comments: Normal appearing external female genitalia, normal [place]th[place]'s and [place]'s glands bilaterally  .      Vaginal exam comments: Mild vaginal relaxation..        Right Adnexa: not tender and no mass present.     Left Adnexa: not tender and no mass present.     Cervix is absent.      Uterus is absent.   Breasts:     Right: No inverted nipple, mass, nipple discharge or skin change.      Left: No inverted nipple, mass, nipple discharge or skin change.   Neck:      Thyroid: No thyromegaly.   Cardiovascular:      Rate and Rhythm: Normal rate and regular rhythm.      Pulses: Normal pulses.      Heart sounds: Normal heart  sounds.   Pulmonary:      Effort: Pulmonary effort is normal.      Breath sounds: Normal breath sounds and air entry.   Abdominal:      Palpations: Abdomen is soft.      Tenderness: There is no abdominal tenderness.   Musculoskeletal:      Cervical back: Neck supple.   Lymphadenopathy:      Upper Body:      Right upper body: No axillary adenopathy.      Left upper body: No axillary adenopathy.   Neurological:      Mental Status: She is alert and oriented to person, place, and time.      Comments: Pleasant and cooperative

## 2025-06-18 ENCOUNTER — HOSPITAL ENCOUNTER (OUTPATIENT)
Dept: OPERATING ROOM | Facility: CLINIC | Age: 65
Discharge: HOME | End: 2025-06-18
Payer: COMMERCIAL

## 2025-06-18 VITALS
TEMPERATURE: 97.7 F | DIASTOLIC BLOOD PRESSURE: 98 MMHG | WEIGHT: 220.46 LBS | SYSTOLIC BLOOD PRESSURE: 198 MMHG | RESPIRATION RATE: 18 BRPM | BODY MASS INDEX: 32.65 KG/M2 | OXYGEN SATURATION: 97 % | HEIGHT: 69 IN | HEART RATE: 87 BPM

## 2025-06-18 DIAGNOSIS — M54.50 LUMBAR PAIN: ICD-10-CM

## 2025-06-18 PROCEDURE — 3600000006 HC OR TIME - EACH INCREMENTAL 1 MINUTE - PROCEDURE LEVEL ONE

## 2025-06-18 PROCEDURE — 7100000009 HC PHASE TWO TIME - INITIAL BASE CHARGE

## 2025-06-18 PROCEDURE — 2720000007 HC OR 272 NO HCPCS

## 2025-06-18 PROCEDURE — 7100000010 HC PHASE TWO TIME - EACH INCREMENTAL 1 MINUTE

## 2025-06-18 PROCEDURE — 2500000004 HC RX 250 GENERAL PHARMACY W/ HCPCS (ALT 636 FOR OP/ED): Performed by: PAIN MEDICINE

## 2025-06-18 PROCEDURE — 64636 DESTROY L/S FACET JNT ADDL: CPT | Performed by: PAIN MEDICINE

## 2025-06-18 PROCEDURE — 64635 DESTROY LUMB/SAC FACET JNT: CPT | Performed by: PAIN MEDICINE

## 2025-06-18 PROCEDURE — 3600000001 HC OR TIME - INITIAL BASE CHARGE - PROCEDURE LEVEL ONE

## 2025-06-18 RX ORDER — MIDAZOLAM HYDROCHLORIDE 1 MG/ML
INJECTION, SOLUTION INTRAMUSCULAR; INTRAVENOUS AS NEEDED
Status: COMPLETED | OUTPATIENT
Start: 2025-06-18 | End: 2025-06-18

## 2025-06-18 RX ORDER — INDOMETHACIN 25 MG/1
CAPSULE ORAL AS NEEDED
Status: COMPLETED | OUTPATIENT
Start: 2025-06-18 | End: 2025-06-18

## 2025-06-18 RX ORDER — LIDOCAINE HYDROCHLORIDE 5 MG/ML
INJECTION, SOLUTION INFILTRATION; INTRAVENOUS AS NEEDED
Status: COMPLETED | OUTPATIENT
Start: 2025-06-18 | End: 2025-06-18

## 2025-06-18 RX ORDER — METHYLPREDNISOLONE ACETATE 40 MG/ML
INJECTION, SUSPENSION INTRA-ARTICULAR; INTRALESIONAL; INTRAMUSCULAR; SOFT TISSUE AS NEEDED
Status: COMPLETED | OUTPATIENT
Start: 2025-06-18 | End: 2025-06-18

## 2025-06-18 RX ORDER — HYDRALAZINE HYDROCHLORIDE 20 MG/ML
5 INJECTION INTRAMUSCULAR; INTRAVENOUS ONCE
Status: COMPLETED | OUTPATIENT
Start: 2025-06-18 | End: 2025-06-18

## 2025-06-18 RX ADMIN — LIDOCAINE HYDROCHLORIDE 10 ML: 5 INJECTION, SOLUTION INFILTRATION at 12:25

## 2025-06-18 RX ADMIN — SODIUM BICARBONATE 1 ML: 84 INJECTION, SOLUTION INTRAVENOUS at 12:26

## 2025-06-18 RX ADMIN — HYDRALAZINE HYDROCHLORIDE 5 MG: 20 INJECTION INTRAMUSCULAR; INTRAVENOUS at 12:05

## 2025-06-18 RX ADMIN — METHYLPREDNISOLONE ACETATE 40 MG: 40 INJECTION, SUSPENSION INTRA-ARTICULAR; INTRALESIONAL; INTRAMUSCULAR; SOFT TISSUE at 12:41

## 2025-06-18 RX ADMIN — MIDAZOLAM 2 MG: 1 INJECTION INTRAMUSCULAR; INTRAVENOUS at 12:24

## 2025-06-18 ASSESSMENT — PAIN - FUNCTIONAL ASSESSMENT
PAIN_FUNCTIONAL_ASSESSMENT: 0-10

## 2025-06-18 ASSESSMENT — PAIN SCALES - GENERAL
PAINLEVEL_OUTOF10: 4
PAINLEVEL_OUTOF10: 0 - NO PAIN
PAINLEVEL_OUTOF10: 0 - NO PAIN

## 2025-06-18 NOTE — BRIEF OP NOTE
Date: 2025  OR Location: [facility] [place] [facility]    Name: Libra Chery : 1960, Age: 64 y.o., MRN: [id number], Sex: female    Diagnosis  Lumbar facet syndrome * No Diagnosis Codes entered *     Procedures  Rt lumbar facet medial branch radiofrequency ablation L4/5 L5/S1    Surgeons   MD Maurice    Resident/Fellow/Other Assistant:  MD Anton    Staff:   Circulator: Kalie Taylor Person: Reanna    Anesthesia Staff: No anesthesia staff entered.    Procedure Summary  Anesthesia: Anesthesia type not filed in the log.  ASA: 3  Estimated Blood Loss: 0 mL  Intra-op Medications: * Intraprocedure medication information is unavailable because the case start and end events have not been set *      Intraprocedure I/O Totals       None           Specimen: No specimens collected               Findings:   Libra Chery is a 64 y.o. female  with  lumbar spondylosis here for lumbar medial branch radiofrequency ablations on the rt side     Procedure performed: *right lumbar medial branch radiofrequency ablations at L3, 4, 5 using fluoroscopic guidance    Indication: lumbar spondylosis with greater than 80% pain relief on diagnostic medial branch blocks x2    Performed by: Dr. Richards  Assistant: Jose Ridley MD    The patient was identified in the preoperative holding area and informed consent was obtained.  The surgical area was marked according to protocol.  The patient was transported to the operating room on the [facility] and a timeout was conducted. ASA Standard monitors were applied.     The patient was transferred to the operating room table and placed in prone position.  X-ray guidance was used to ascertain the appropriate levels.  The site was prepped and draped in the usual fashion using ChloraPrep and sterile towels.  Skin anesthesia was achieved with 0.5% lidocaine with bicarbonate using a 27-gauge hypodermic needle. 145 mm  insulated radiofrequency needles with 10 mm active tips were advanced to the target areas  using fluoroscopy and biplanar images.  Sensory and motor testing was conducted at all 3 levels with appropriate response.  1.5 mL of ropivacaine were injected at each level and a 3-minute timer was started to make sure proper analgesia.  We then connected the radiofrequency neurotomy at 80 °C for 1 minute 30 seconds  twice without issue.  Subsequently, we injected 10 mg of kenalog at each level.  The needles were removed.    The procedure was done without any evidence of complications and the patient was transported to the recovery area.  There were no apparent complications.  The patient was discharged in stable condition.    Anesthesia: Local and  2 mg midazolam conscious sedation      Complications:  None; patient tolerated the procedure well.     Disposition: PACU - hemodynamically stable.  Condition: stable  Specimens Collected: No specimens collected  Attending Attestation: I was present and scrubbed for the entire procedure.    MD Maurice

## 2025-06-18 NOTE — H&P
History Of Present Illness  Libra Chery is a 64 y.o. female presenting with low back pain     Past Medical History  Pain Management H&P    History Of Present Illness  Libra Chery is a 64 y.o. female presents for procedure state below. Endorses no changes in past medical history or medical health since last seen in clinic.      Past Medical History  She has a past medical history of Acute bronchitis (2023), Acute bronchospasm (2023), Chronic pain syndrome (02/15/2017), Essential (primary) hypertension, Personal history of other diseases of the musculoskeletal system and connective tissue, Personal history of other medical treatment (2019), Personal history of other mental and behavioral disorders, Radiculopathy, lumbar region, Urinary frequency (2023), Vaginal discharge (2023), and Vitamin D deficiency, unspecified.    She has no past medical history of Personal history of irradiation.    Surgical History  She has a past surgical history that includes Carpal tunnel release (2013);  section, classic (12/10/2014); and Other surgical history (12/10/2014).     Social History  She reports that she has never smoked. She has never used smokeless tobacco. She reports current drug use. Drug: Marijuana. No history on file for alcohol use.    Family History  Family History[1]     Allergies  Patient has no known allergies.    Review of Symptoms:   Constitutional: Negative for chills, diaphoresis or fever  HENT: Negative for neck swelling  Eyes:.  Negative for eye pain  Respiratory:.  Negative for cough, shortness of breath or wheezing    Cardiovascular:.  Negative for chest pain or palpitations  Gastrointestinal:.  Negative for abdominal pain, nausea and vomiting  Genitourinary:.  Negative for urgency  Musculoskeletal:  Positive for back pain. Positive for joint pain. Denies falls within the past 3 months.  Skin: Negative for wounds or itching   Neurological: Negative for  dizziness, seizures, loss of consciousness and weakness  Endo/Heme/Allergies: Does not bruise/bleed easily  Psychiatric/Behavioral: Negative for depression. The patient does not appear anxious.       PHYSICAL EXAM  Vitals signs reviewed  Constitutional:       General: Not in acute distress     Appearance: Normal appearance. Not ill-appearing.  HENT:     Head: Normocephalic and atraumatic  Eyes:     Conjunctiva/sclera: Conjunctivae normal  Cardiovascular:     Rate and Rhythm: Normal rate and regular rhythm  Pulmonary:     Effort: No respiratory distress  Abdominal:     Palpations: Abdomen is soft  Musculoskeletal: BILL  Skin:     General: Skin is warm and dry  Neurological:     General: No focal deficit present  Psychiatric:         Mood and Affect: Mood normal         Behavior: Behavior normal     Last Recorded Vitals  There were no vitals taken for this visit.    Relevant Results  Current Outpatient Medications   Medication Instructions    cholecalciferol (VITAMIN D3) 50 mcg, Daily    dicyclomine (BENTYL) 20 mg, oral, 4 times daily PRN    ergocalciferol (VITAMIN D-2) 1.25 mg, oral, Weekly    hydroxychloroquine (Plaquenil) 200 mg tablet oral, 2 times daily    ibuprofen 600 mg, oral, 4 times daily PRN    lisinopriL-hydrochlorothiazide 20-25 mg tablet 1 tablet, oral, Daily    meloxicam (MOBIC) 15 mg, oral, Daily    methocarbamol (ROBAXIN) 1,000 mg, oral, Nightly    orphenadrine (NORFLEX) 100 mg, oral, 2 times daily PRN, Do not crush, chew, or split.    topiramate (TOPAMAX) 100 mg, oral, 2 times daily       No results found for this or any previous visit from the past 1000 days.     No image results found.       No diagnosis found.     ASSESSMENT/PLAN  Libra Chery is a 64 y.o. female with a past medical history of lumbar spondylosis and facet syndrome     Patient denies any recent antibiotic use or infections, denies any blood thinner use, and denies contrast or local anesthetic allergies     Risks, benefits,  alternatives discussed. All questions answered to the best of my ability. Patient agrees to proceed.      Our plan is as follows:  - Proceed with RF ablation of the medial branch lumbar facet L4/5 L5/S1         Nellie Richards MD      Surgical History  Surgical History[2]     Social History  She reports that she has never smoked. She has never used smokeless tobacco. She reports current drug use. Drug: Marijuana. No history on file for alcohol use.    Family History  Family History[3]     Allergies  Patient has no known allergies.    Review of Systems     Physical Exam     Last Recorded Vitals  There were no vitals taken for this visit.    Relevant Results                 RFA lumbar facet 4/5 5/S1    I spent 5 minutes in the professional and overall care of this patient.      Nellie Richards MD         [1]   Family History  Problem Relation Name Age of Onset    Breast cancer Mother loreto jesus  63   [2]   Past Surgical History:  Procedure Laterality Date    CARPAL TUNNEL RELEASE  2013    Neuroplasty Decompression Median Nerve At Carpal Tunnel     SECTION, CLASSIC  12/10/2014     Section    OTHER SURGICAL HISTORY  12/10/2014    Laparoscopy With Vaginal Hysterectomy For Uterus > 250g   [3]   Family History  Problem Relation Name Age of Onset    Breast cancer Mother loreto jesus  63

## 2025-06-18 NOTE — DISCHARGE INSTRUCTIONS
Post-injection instructions:    Your pain may not be gone immediately after the procedure--it usually takes the steroid 3-5 days to start working.   It may take several weeks for the medicine to reach its' full effect.   Pay attention to how much pain relief (what percentage compared to before the procedure) you get and for how long it lasts.   We will ask you for this at the follow up visit.     Activity: Avoid strenuous activity for 24 hours. After that return to your normal activity level.     Bandages: Remove tomorrow    Showering/Bathing: You may shower after bandage is removed     Follow up: With Dr. Richards over the phone in one week to discuss how you are doing  Call Summer to Schedule.   Summer's Phone:  [phone]    After hours, call the   ([phone]) and ask for the pain management answering service if you notice any of the below:     Call the doctor immediately: if you notice:    Excessive bleeding from procedure site (brisk bright red bleeding from the site or bleeding that soaks the bandages or does not stop)   Severe headache  Inability to walk, leg or arm weakness or numbness that is significantly worse after the procedure   Uncontrolled pain   Inability to control your bowels or bladder   Signs of infection: Fever above 101.5F, redness, swelling, pus or drainage from the site

## 2025-11-17 ENCOUNTER — APPOINTMENT (OUTPATIENT)
Dept: PRIMARY CARE | Facility: CLINIC | Age: 65
End: 2025-11-17
Payer: COMMERCIAL

## 2026-06-16 ENCOUNTER — APPOINTMENT (OUTPATIENT)
Dept: OBSTETRICS AND GYNECOLOGY | Facility: CLINIC | Age: 66
End: 2026-06-16
Payer: COMMERCIAL